# Patient Record
Sex: MALE | Race: WHITE | NOT HISPANIC OR LATINO | Employment: OTHER | URBAN - NONMETROPOLITAN AREA
[De-identification: names, ages, dates, MRNs, and addresses within clinical notes are randomized per-mention and may not be internally consistent; named-entity substitution may affect disease eponyms.]

---

## 2017-03-06 ENCOUNTER — OFFICE VISIT (OUTPATIENT)
Dept: NEUROSURGERY | Facility: CLINIC | Age: 77
End: 2017-03-06

## 2017-03-06 VITALS
WEIGHT: 286 LBS | BODY MASS INDEX: 38.74 KG/M2 | SYSTOLIC BLOOD PRESSURE: 122 MMHG | DIASTOLIC BLOOD PRESSURE: 60 MMHG | HEIGHT: 72 IN

## 2017-03-06 DIAGNOSIS — M50.30 DEGENERATION OF CERVICAL INTERVERTEBRAL DISC: Primary | ICD-10-CM

## 2017-03-06 DIAGNOSIS — Z78.9 NON-SMOKER: ICD-10-CM

## 2017-03-06 DIAGNOSIS — R55 SYNCOPE AND COLLAPSE: ICD-10-CM

## 2017-03-06 DIAGNOSIS — E66.3 OVERWEIGHT: ICD-10-CM

## 2017-03-06 PROCEDURE — 99204 OFFICE O/P NEW MOD 45 MIN: CPT | Performed by: NURSE PRACTITIONER

## 2017-03-06 RX ORDER — FUROSEMIDE 20 MG/1
20 TABLET ORAL DAILY
COMMUNITY
Start: 2017-02-15

## 2017-03-06 RX ORDER — ESCITALOPRAM OXALATE 20 MG/1
10 TABLET ORAL DAILY
COMMUNITY
Start: 2017-02-15

## 2017-03-06 RX ORDER — SPIRONOLACTONE 25 MG/1
25 TABLET ORAL DAILY
COMMUNITY
Start: 2017-02-15 | End: 2019-01-15

## 2017-03-06 RX ORDER — SIMVASTATIN 40 MG
40 TABLET ORAL NIGHTLY
COMMUNITY
Start: 2017-02-23

## 2017-03-06 RX ORDER — ROPINIROLE 0.25 MG/1
0.25 TABLET, FILM COATED ORAL DAILY
COMMUNITY
Start: 2017-02-02 | End: 2018-02-02 | Stop reason: DRUGHIGH

## 2017-03-06 RX ORDER — LISINOPRIL 5 MG/1
5 TABLET ORAL DAILY
COMMUNITY
Start: 2017-02-15

## 2017-03-06 RX ORDER — GABAPENTIN 400 MG/1
CAPSULE ORAL
COMMUNITY
Start: 2017-02-02 | End: 2017-05-02

## 2017-03-06 RX ORDER — METOLAZONE 5 MG/1
5 TABLET ORAL
COMMUNITY
Start: 2017-02-23 | End: 2019-01-15

## 2017-03-06 RX ORDER — POTASSIUM CHLORIDE 20 MEQ/1
20 TABLET, EXTENDED RELEASE ORAL 2 TIMES DAILY
COMMUNITY
Start: 2017-02-23

## 2017-03-06 NOTE — PROGRESS NOTES
Chief complaint:   Chief Complaint   Patient presents with   • Neck Pain     neck pain after a fall down some stairs at this home about 4 months ago, some arm numbness and tingling down both right and left sides.  Was sent for some physical therapy but this did not help at all.        Subjective     HPI: This is a 76-year-old male gentleman who is referred to us by Dr. Joya for neck pain.  He is here to be evaluated today.  He states that he did have neck surgery in 2017 and did help with the pain that is experiencing however about 4 months ago he did sustain a fall down some steps and said that he did have a return of his neck pain.  He states that the pain in his neck is constant.  Its worse when he moves his neck.  Its better when is lying down.  He says that he will have some pain that radiates down his right arm in a radicular fashion.  This pain is a burning sensation.  This pain is intermittent.  Its worse with activity.  He does have numbness and tingling in his hands.  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  He has been in a wheelchair since his stroke 2013.  Pain in his neck is a dull aching pain.  He rates the pain on a scale 0-10 at an 8.  He says it does interfere with his activities of daily living.  He is having difficulty and using his right arm since the fall.  He has been through physical therapy without any relief.  He is done no chiropractic care pain management injections.  He does complain of whenever he turns his head that he will fall asleep.  He says this was even noted during physical therapy as well.    Review of Systems   Constitutional: Positive for unexpected weight change.   HENT: Positive for hearing loss and sinus pressure.    Gastrointestinal:        Change in bowel movement   Endocrine: Positive for polyphagia and polyuria.   Musculoskeletal: Positive for joint swelling and neck pain.   Neurological:        Stroke  Memory loss/confusion   All  "other systems reviewed and are negative.       No past medical history on file.  No past surgical history on file.  No family history on file.  Social History   Substance Use Topics   • Smoking status: Never Smoker   • Smokeless tobacco: None   • Alcohol use No       (Not in a hospital admission)  Allergies:  Morphine and related and Penicillins    Objective      Vital Signs  Visit Vitals   • /60 (BP Location: Right arm, Patient Position: Sitting)   • Ht 72\" (182.9 cm)   • Wt 286 lb (130 kg)   • BMI 38.79 kg/m2       Physical Exam   Constitutional: He is oriented to person, place, and time. He appears well-developed and well-nourished.   HENT:   Head: Normocephalic.   Eyes: Conjunctivae, EOM and lids are normal. Pupils are equal, round, and reactive to light.   Neck: Normal range of motion.   Cardiovascular: Normal rate, regular rhythm and normal heart sounds.    Pulmonary/Chest: Effort normal and breath sounds normal.   Abdominal: Normal appearance.   Musculoskeletal: Normal range of motion.        Cervical back: He exhibits pain.   Neurological: He is alert and oriented to person, place, and time. He has normal reflexes. He displays normal reflexes. No cranial nerve deficit or sensory deficit. GCS eye subscore is 4. GCS verbal subscore is 5. GCS motor subscore is 6.   Reflex Scores:       Patellar reflexes are 2+ on the left side.  Strength is 5 out of 5.  Essential tremor noted in the right upper extremity.  I did not ambulate the patient.  Clonus was noted in the left lower extremity.  Adiel sign was negative.  The patient is able to transfer but for the most part does stay in the wheelchair.   Skin: Skin is warm.   Psychiatric: He has a normal mood and affect. His speech is normal and behavior is normal. Thought content normal. Cognition and memory are normal.       Results Review: CT scan of his cervical spine shows at the C3 through 7 anterior cervical fusion.  There is evidence of degeneration at C2 " 3 and at C7-T1.  There is also large spurring noted at these levels.  No fractures visualized.              Assessment/Plan: At this point what amended do is set the patient up to do an MRI of the cervical spine and then also doing a CT angiogram of the soft tissue of his neck to make sure that there is no compression on there carotid or vertebral arteries.  He will follow-up with Dr. Amos once is completed.  BMI shows the patient is overweight.  BMI chart was given the patient.  He is a nonsmoker.    I discussed the patients findings and my recommendations with patient    Haider Gray, SHELLY  03/06/17  11:31 AM

## 2017-03-15 ENCOUNTER — HOSPITAL ENCOUNTER (OUTPATIENT)
Dept: CT IMAGING | Facility: HOSPITAL | Age: 77
Discharge: HOME | End: 2017-03-15

## 2017-03-15 ENCOUNTER — HOSPITAL ENCOUNTER (OUTPATIENT)
Dept: MRI IMAGING | Facility: HOSPITAL | Age: 77
Discharge: HOME | End: 2017-03-15
Admitting: NURSE PRACTITIONER

## 2017-03-15 DIAGNOSIS — M50.30 DEGENERATION OF CERVICAL INTERVERTEBRAL DISC: Primary | ICD-10-CM

## 2017-03-15 DIAGNOSIS — R55 SYNCOPE AND COLLAPSE: ICD-10-CM

## 2017-03-15 DIAGNOSIS — M50.30 DEGENERATION OF CERVICAL INTERVERTEBRAL DISC: ICD-10-CM

## 2017-03-15 LAB — CREAT BLDA-MCNC: 1.8 MG/DL (ref 0.6–1.3)

## 2017-03-15 PROCEDURE — 70547 MR ANGIOGRAPHY NECK W/O DYE: CPT

## 2017-03-15 PROCEDURE — 72141 MRI NECK SPINE W/O DYE: CPT

## 2017-03-15 PROCEDURE — 82565 ASSAY OF CREATININE: CPT

## 2017-03-17 DIAGNOSIS — R55 SYNCOPE, UNSPECIFIED SYNCOPE TYPE: Primary | ICD-10-CM

## 2017-04-26 ENCOUNTER — HOSPITAL ENCOUNTER (OUTPATIENT)
Dept: MRI IMAGING | Facility: HOSPITAL | Age: 77
Discharge: HOME | End: 2017-04-26

## 2017-05-02 ENCOUNTER — OFFICE VISIT (OUTPATIENT)
Dept: NEUROSURGERY | Facility: CLINIC | Age: 77
End: 2017-05-02

## 2017-05-02 VITALS
SYSTOLIC BLOOD PRESSURE: 122 MMHG | DIASTOLIC BLOOD PRESSURE: 60 MMHG | HEIGHT: 72 IN | BODY MASS INDEX: 37.65 KG/M2 | WEIGHT: 278 LBS

## 2017-05-02 DIAGNOSIS — G25.0 ESSENTIAL TREMOR: ICD-10-CM

## 2017-05-02 DIAGNOSIS — Z78.9 NON-SMOKER: ICD-10-CM

## 2017-05-02 DIAGNOSIS — M54.42 CHRONIC BILATERAL LOW BACK PAIN WITH BILATERAL SCIATICA: ICD-10-CM

## 2017-05-02 DIAGNOSIS — R55 SYNCOPE AND COLLAPSE: ICD-10-CM

## 2017-05-02 DIAGNOSIS — M50.30 DEGENERATION OF CERVICAL INTERVERTEBRAL DISC: Primary | ICD-10-CM

## 2017-05-02 DIAGNOSIS — G89.29 CHRONIC BILATERAL LOW BACK PAIN WITH BILATERAL SCIATICA: ICD-10-CM

## 2017-05-02 DIAGNOSIS — M54.41 CHRONIC BILATERAL LOW BACK PAIN WITH BILATERAL SCIATICA: ICD-10-CM

## 2017-05-02 PROBLEM — M54.50 CHRONIC MIDLINE LOW BACK PAIN WITHOUT SCIATICA: Status: RESOLVED | Noted: 2017-05-02 | Resolved: 2017-05-02

## 2017-05-02 PROBLEM — M54.50 CHRONIC MIDLINE LOW BACK PAIN WITHOUT SCIATICA: Status: ACTIVE | Noted: 2017-05-02

## 2017-05-02 PROCEDURE — 99213 OFFICE O/P EST LOW 20 MIN: CPT | Performed by: NEUROLOGICAL SURGERY

## 2017-05-02 RX ORDER — RANITIDINE 300 MG/1
300 TABLET ORAL NIGHTLY
COMMUNITY
Start: 2017-04-07 | End: 2019-01-15

## 2017-05-02 RX ORDER — PANTOPRAZOLE SODIUM 40 MG/1
40 TABLET, DELAYED RELEASE ORAL DAILY
COMMUNITY
Start: 2017-04-07

## 2017-05-02 RX ORDER — GABAPENTIN 600 MG/1
600 TABLET ORAL 3 TIMES DAILY
COMMUNITY
Start: 2017-03-27 | End: 2018-03-27

## 2017-05-02 RX ORDER — PROMETHAZINE HYDROCHLORIDE AND CODEINE PHOSPHATE 6.25; 1 MG/5ML; MG/5ML
SYRUP ORAL
COMMUNITY
Start: 2017-03-29 | End: 2017-05-02

## 2017-05-02 RX ORDER — EXENATIDE 2 MG/.65ML
2 INJECTION, SUSPENSION, EXTENDED RELEASE SUBCUTANEOUS WEEKLY
COMMUNITY
Start: 2017-04-17 | End: 2020-08-27

## 2017-05-30 ENCOUNTER — HOSPITAL ENCOUNTER (OUTPATIENT)
Dept: MRI IMAGING | Facility: HOSPITAL | Age: 77
Discharge: HOME | End: 2017-05-30
Attending: NEUROLOGICAL SURGERY | Admitting: NEUROLOGICAL SURGERY

## 2017-05-30 ENCOUNTER — OFFICE VISIT (OUTPATIENT)
Dept: NEUROSURGERY | Facility: CLINIC | Age: 77
End: 2017-05-30

## 2017-05-30 VITALS
WEIGHT: 278 LBS | DIASTOLIC BLOOD PRESSURE: 60 MMHG | BODY MASS INDEX: 37.65 KG/M2 | SYSTOLIC BLOOD PRESSURE: 122 MMHG | HEIGHT: 72 IN

## 2017-05-30 DIAGNOSIS — G89.29 CHRONIC BILATERAL LOW BACK PAIN WITH BILATERAL SCIATICA: ICD-10-CM

## 2017-05-30 DIAGNOSIS — M50.30 DEGENERATION OF CERVICAL INTERVERTEBRAL DISC: ICD-10-CM

## 2017-05-30 DIAGNOSIS — M54.41 CHRONIC BILATERAL LOW BACK PAIN WITH BILATERAL SCIATICA: ICD-10-CM

## 2017-05-30 DIAGNOSIS — M51.37 DEGENERATION OF LUMBAR OR LUMBOSACRAL INTERVERTEBRAL DISC: Primary | ICD-10-CM

## 2017-05-30 DIAGNOSIS — M54.42 CHRONIC BILATERAL LOW BACK PAIN WITH BILATERAL SCIATICA: ICD-10-CM

## 2017-05-30 DIAGNOSIS — Z78.9 NON-SMOKER: ICD-10-CM

## 2017-05-30 PROCEDURE — 72148 MRI LUMBAR SPINE W/O DYE: CPT

## 2017-05-30 PROCEDURE — 99213 OFFICE O/P EST LOW 20 MIN: CPT | Performed by: NEUROLOGICAL SURGERY

## 2017-05-30 RX ORDER — CLINDAMYCIN HYDROCHLORIDE 150 MG/1
CAPSULE ORAL
COMMUNITY
Start: 2017-05-25 | End: 2017-06-06

## 2017-06-06 ENCOUNTER — OFFICE VISIT (OUTPATIENT)
Dept: NEUROLOGY | Facility: CLINIC | Age: 77
End: 2017-06-06

## 2017-06-06 VITALS
WEIGHT: 283 LBS | HEART RATE: 68 BPM | DIASTOLIC BLOOD PRESSURE: 68 MMHG | BODY MASS INDEX: 38.33 KG/M2 | HEIGHT: 72 IN | RESPIRATION RATE: 20 BRPM | SYSTOLIC BLOOD PRESSURE: 118 MMHG

## 2017-06-06 DIAGNOSIS — G45.0 VERTEBROBASILAR INSUFFICIENCY: ICD-10-CM

## 2017-06-06 DIAGNOSIS — R06.02 SHORTNESS OF BREATH: ICD-10-CM

## 2017-06-06 DIAGNOSIS — I10 ESSENTIAL (PRIMARY) HYPERTENSION: ICD-10-CM

## 2017-06-06 DIAGNOSIS — R25.1 TREMOR: ICD-10-CM

## 2017-06-06 DIAGNOSIS — R40.4 EPISODE OF ALTERED CONSCIOUSNESS: Primary | ICD-10-CM

## 2017-06-06 PROCEDURE — 99204 OFFICE O/P NEW MOD 45 MIN: CPT | Performed by: PSYCHIATRY & NEUROLOGY

## 2017-06-06 NOTE — PROGRESS NOTES
"Aliyah Chandra, 1940, is a male who is being seen today for   Chief Complaint   Patient presents with   • Loss of Consciousness       HISTORY OF PRESENT ILLNESS: Patient seen for episodes of altered consciousness.  Patient had prior neck surgery several years ago.  Patient had a fall 4 months ago out of the wheelchair and hurt his head and neck again in his back after he said his back popped.  Patient since says and has had several episodes of altered consciousness.  Sometimes when he turns his head or rides on a bumpy road he will suddenly go unconscious for 5-10 minutes.  There is no tongue biting or incontinence is no headache and there is no jerking.  Patient and awakens.  Patient had a prior stroke in 2013 that affected his left side.  Patient has quite a bit of pain in his neck.  Patient has MRA of the neck which was noted to have a high-grade stenosis in distal right vertebral artery.  The left vertebral artery was patent.  Patient did not have the dye test previously because his kidney function was poor (creatinine 1.8 in March ).  Patient was supposed is seen a renal specialist  who told the family that the patient's kidney function was \"fine\" but we do not have any of those records are blood work for review.  Also the patient was in the hospital at [facility] in April for heart problems and shortness of breath but we do not have any of those reports for review.  Patient also has been followed by Dr. Amos for his neck problems and ended up having an MRI of the cervical spine which showed status post anterior fusion apparently no other acute problems.  Patient also had an MRI of the lumbar spine which showed degenerative changes throughout the lumbar spine and patient has been referred to pain management.  Patient is also apparently diabetic on Bydureon.  And blood sugars and blood pressures have not been checked when these episodes occur.  REVIEW OF SYSTEMS: "   GENERAL: No fever chills  PULMONARY: As above  CVS:  As above  GASTROINTESTINAL: No acute GI distress  GENITOURINARY: No acute  distress  GYN: Not applicable  MUSCULOSKELETAL: As above  HEENT:  Patient  had cataract surgery  ENDOCRINE:  As above  PSYCHIATRIC: No acute psychiatric symptoms  HEMATOLOGY: No blood work for review  SKIN: No skin changes except for history of ingrown toenails  Family history reviewed: Patient's father had tremor  Social history: Patient denies alcohol or drug use or smoking.    PHYSICAL EXAMINATION:    GENERAL: Other than the patient's pain patient in no acute distress  CRANIUM: Normocephalic/atraumatic  HEENT: No acute fundic abnormalities.  Pupils equal round reactive to light.       EYES: EOMs intact without nystagmus.  Fields full to confrontation.       EARS:  Tympanic membranes normal and hears tuning fork bilaterally.       THROAT: No oropharynx abnormalities       NECK:  No bruits/no lymphadenopathy  CHEST: No acute cardiopulmonary abnormalities by auscultation  ABDOMEN: Nondistended  EXTREMITIES: Patient has dystrophic changes of the lower extremities below the knee.  Patient has positive dorsalis pedis pulses  NEURO: Patient alert and follows commands without difficulty  SPEECH:  Normal    CRANIAL NERVES:  Motor sensory about the face normal and symmetric  EXTREMITIES:  Patient has some rest tremor right greater than left upper extremities and increase in tremor with intention bilaterally.  Some cogwheeling /rigidity noted  MOTOR STRENGTH:  Motor strength seemed to be normal other than the intrinsic hand muscles with some atrophy noted in the ulnar innervated intrinsics of the right hand  STATION AND GAIT:  Patient required some assistance to stand and take steps which she did sparingly and then went back to the chair/no cerebellar abnormalities appreciated on finger nose  CEREBELLAR:  Patient minimally able to do heel shin  SENSORY:  Absent sensation to vibration to the  knees bilaterally with decreased pin sensation distal to proximal to the about mid calf.  This decrease in pin sensation was more significant left face arm and leg.    REFLEXES:  Absent reflexes throughout with toes equivocal    ASSESSMENT AND PLAN:  Episodes of altered consciousness of uncertain etiology.  Patient may have vertebrobasilar insufficiency and need CT angiography head and neck as soon as we can determine his situation with his kidney function.  Unfortunately we do not have any records on that at this point.  Also the patient's episodes could be related to his blood sugar blood pressure or cardiac functions were possible seizures.  The family is told that if the patient has another episode he needs to be evaluated in the emergency room immediately as there is some possible increased risk of stroke with this/ patient will be seen by vascular surgery tomorrow and also getting EEG/MRI brain and further blood work and overnight continuous oximetry.      Ross was seen today for loss of consciousness.    Diagnoses and all orders for this visit:    Episode of altered consciousness  -     CBC & Differential; Future  -     Comprehensive Metabolic Panel; Future  -     EEG; Future  -     Lipid Panel; Future  -     Magnesium; Future  -     MRI Brain Without Contrast; Future  -     Sedimentation Rate; Future  -     T4, Free; Future  -     Vitamin B12; Future  -     Folate; Future    Vertebrobasilar insufficiency  -     Ambulatory Referral to Vascular Surgery    Tremor    Essential (primary) hypertension   -     Lipid Panel; Future    Other orders  -     Cancel: MRI Angiogram Head Without Contrast; Future  -     Cancel: CT Angiogram Neck With & Without Contrast; Future

## 2017-06-06 NOTE — PATIENT INSTRUCTIONS
If patient has an episode of altered consciousness or stroke symptoms patient needs to return to emergency room immediately.  No driving until released.  Blood pressures and blood sugars are to be obtained regularly

## 2017-06-07 ENCOUNTER — OFFICE VISIT (OUTPATIENT)
Dept: VASCULAR SURGERY | Facility: CLINIC | Age: 77
End: 2017-06-07

## 2017-06-07 VITALS
HEART RATE: 72 BPM | WEIGHT: 280 LBS | SYSTOLIC BLOOD PRESSURE: 116 MMHG | DIASTOLIC BLOOD PRESSURE: 72 MMHG | BODY MASS INDEX: 37.93 KG/M2 | HEIGHT: 72 IN

## 2017-06-07 DIAGNOSIS — R55 SYNCOPE AND COLLAPSE: ICD-10-CM

## 2017-06-07 DIAGNOSIS — I65.01 VERTEBRAL ARTERY STENOSIS, RIGHT: ICD-10-CM

## 2017-06-07 DIAGNOSIS — I65.23 BILATERAL CAROTID ARTERY STENOSIS: Primary | ICD-10-CM

## 2017-06-07 PROCEDURE — 99214 OFFICE O/P EST MOD 30 MIN: CPT | Performed by: NURSE PRACTITIONER

## 2017-06-07 RX ORDER — ASPIRIN 81 MG/1
81 TABLET ORAL DAILY
Status: ON HOLD | COMMUNITY
End: 2018-04-09

## 2017-06-07 NOTE — PROGRESS NOTES
06/07/2017      Marino Pena MD  [address]  [address]  [place] [address]    Ross Chandra  1940    Chief Complaint   Patient presents with   • Stroke     Referred per Dr Pena. CVA in 2013 with residual left sided weakness,BUE tremor worse on right .Vertebrobaisilar insufficiency.       Dear Marino Pena MD:      HPI  I had the pleasure of seeing your patient Ross Chandra in the office today.  Thank you kindly for this consultation.  As you recall, Ross Chandra is a 76 y.o.  male who you are currently following for Evaluation of syncopal episodes.  The patient does state he has had previous neck surgery several years ago which did help his pain.  Unfortunately, he had a fall approximately 4 months ago and hurt his neck again.  Since his fall he has had several syncopal episodes.  He reports syncopal episodes with certain positions or pump B [place].  He has had a previous stroke in 2013 that left him with left-sided weakness.  He has recently been seen by Dr. Amos as well.  He has had recent testing including an MRA of the neck as well as an MRI of the cervical spine and lumbar spine, which I did review in office.  He does also have tremors, mostly to his right side.  He states he does do activities with his left side due to significant tremor on the right.    Past Medical History:   Diagnosis Date   • Depression    • GERD (gastroesophageal reflux disease)    • Hyperlipidemia    • Prediabetes    • Stroke 2013    Residual left sided weakness and bilateral arm tremor worse on right side   • Syncope        Past Surgical History:   Procedure Laterality Date   • GALLBLADDER SURGERY     • HERNIA REPAIR      Patient has had 3 hernia surgeries   • NECK SURGERY     • ULNAR NERVE REPAIR         Family History   Problem Relation Age of Onset   • Cancer Mother    • Alzheimer's disease Father    • No Known Problems Sister        Social History     Social History   • Marital status:       Spouse name: N/A   • Number of children: N/A   • Years of education: N/A     Occupational History   • Not on file.     Social History Main Topics   • Smoking status: Former Smoker     Types: Cigars   • Smokeless tobacco: Former User      Comment: quit over 50 years ago   • Alcohol use Yes      Comment: 1-2 [place] and cokes per month   • Drug use: No   • Sexual activity: Defer     Other Topics Concern   • Not on file     Social History Narrative       Allergies   Allergen Reactions   • Morphine And Related    • Penicillins        Prior to Admission medications    Medication Sig Start Date End Date Taking? Authorizing Provider   aspirin 81 MG EC tablet Take 81 mg by mouth Daily.   Yes Historical Provider, MD   BYDUREON 2 MG pen-injector Every 7 (Seven) Days. 4/17/17  Yes Historical Provider, MD   escitalopram (LEXAPRO) 20 MG tablet Daily. 2/15/17  Yes Historical Provider, MD   furosemide (LASIX) 20 MG tablet 20 mg Daily. 2/15/17  Yes Historical Provider, MD   gabapentin (NEURONTIN) 600 MG tablet 600 mg 3 (Three) Times a Day. 3/27/17  Yes Historical Provider, MD   lisinopril (PRINIVIL,ZESTRIL) 5 MG tablet  2/15/17  Yes Historical Provider, MD   metOLazone (ZAROXOLYN) 5 MG tablet 5 mg. Takes one tab on Tuesday and thursday 2/23/17  Yes Historical Provider, MD   pantoprazole (PROTONIX) 40 MG EC tablet 40 mg Daily. 4/7/17  Yes Historical Provider, MD   potassium chloride (K-DUR,KLOR-CON) 20 MEQ CR tablet 20 mEq 2 (Two) Times a Day. 2/23/17  Yes Historical Provider, MD   rOPINIRole (REQUIP) 0.25 MG tablet 0.25 mg Daily. 2/2/17  Yes Historical Provider, MD   simvastatin (ZOCOR) 40 MG tablet 40 mg Every Night. 2/23/17  Yes Historical Provider, MD   spironolactone (ALDACTONE) 25 MG tablet 25 mg Daily. 2/15/17  Yes Historical Provider, MD   raNITIdine (ZANTAC) 300 MG tablet 300 mg Every Night. 4/7/17   Historical Provider, MD       Review of Systems   Constitutional: Negative.    HENT: Negative.    Eyes: Negative.   "  Respiratory: Negative.    Cardiovascular: Negative.    Gastrointestinal: Negative.    Endocrine: Negative.    Genitourinary: Negative.    Musculoskeletal: Positive for arthralgias and neck pain.   Skin: Negative.    Allergic/Immunologic: Negative.    Neurological: Positive for tremors (right arm), syncope and numbness (burning down right arm).   Hematological: Negative.    Psychiatric/Behavioral: Negative.    All other systems reviewed and are negative.      /72  Pulse 72  Ht 72\" (182.9 cm)  Wt 280 lb (127 kg)  BMI 37.97 kg/m2  Physical Exam   Constitutional: He is oriented to person, place, and time. He appears well-developed and well-nourished. No distress.   HENT:   Head: Normocephalic and atraumatic.   Eyes: Pupils are equal, round, and reactive to light. No scleral icterus.   Neck: Neck supple.   Cardiovascular: Normal rate, regular rhythm, normal heart sounds and intact distal pulses.    Pulmonary/Chest: Effort normal and breath sounds normal.   Abdominal: Soft. Bowel sounds are normal.   Musculoskeletal:        Cervical back: He exhibits tenderness.   Mostly wheelchair bound, however can transfer   Neurological: He is alert and oriented to person, place, and time.   Weakness to left side from previous stroke   Skin: Skin is warm and dry. He is not diaphoretic.   Psychiatric: He has a normal mood and affect. His behavior is normal. Judgment and thought content normal.   Nursing note and vitals reviewed.      Mri Lumbar Spine Without Contrast    Result Date: 5/30/2017  Narrative: EXAMINATION:  MRI LUMBAR SPINE WO CONTRAST-  5/30/2017 13:48 CST  HISTORY: Low back pain. M54.42-Lumbago with sciatica, left side; M54.41-Lumbago with sciatica, right side; G89.29-Other chronic pain.  TECHNIQUE: Multiplanar imaging was performed.  COMPARISON: No comparison study.  FINDINGS: At L5-S1, there appears to be at least partial fusion of the disc space. There appears to be some high signal on T1 images within the " disc space suggesting disc calcification. There is endplate spurring producing mild dural sac compression. There is mild to moderate facet arthropathy. There is mild inferior recess foraminal narrowing due to endplate spurring bilaterally. L5 appears to be transitional in shape.  At L4-5, there is mild anterior subluxation of L4 compared to L5. There is mild diffuse disc bulging. There is severe facet arthropathy. There is thickening of ligamentum flavum. 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 There is moderate to severe right foraminal narrowing. There is mild left-sided foraminal narrowing.  At L3-4, there is mild disc bulging. There is severe facet arthropathy. There is thickening of ligamentum flavum. There is mild to moderate central spinal canal stenosis. There is mild inferior recess foraminal narrowing due to disc bulging and endplate spurring.  At L2-3, there is disc bulging and disc degeneration. There is dural sac compression. There is moderate facet arthropathy. There is mild to moderate central spinal canal narrowing. There is mild to moderate inferior recess foraminal narrowing due to disc bulging and endplate spurring.  At L1-2, there is disc bulging producing dural sac compression. There is disc degeneration. There is mild facet arthropathy. These factors produce mild central spinal canal narrowing. There is mild to moderate inferior recess foraminal narrowing due to disc bulging and endplate spurring.  The inferior tip of the conus is at the T12-L1 junction.      Impression: Degenerative changes throughout the lumbar spine. Please see the above report.  This report was finalized on 05/30/2017 16:32 by Dr. Uche Graff MD.    Addendum   Ran Bello MD   Wed Mar 15, 2017  1:38:34 PM CDT   Incidental note is made of a high-grade stenosis in the distal right  vertebral artery. The left vertebral is patent.  This report was finalized on 03/15/2017  13:38 by Dr. Ran Bello MD.   Study Result   MRA neck 3/15/2017 8:30 AM CDT      CLINICAL: syncope, collapse, cervical stenosis/degeneration;  M50.30-Other cervical disc degeneration, unspecified cervical region;  R55-Syncope and collapse      COMPARISON: NONE       Technique: Post-contrast 3D TOF SPGR images were obtained with 3-D and  MIP post processing and reconstruction.       Findings:   The visualized aortic arch and proximal great vessels are unremarkable.       The right common carotid artery, right internal carotid artery, and  right external carotid artery are unremarkable. No aneurysm or  occlusion. No flow-limiting lesion.       The left common carotid artery, left internal carotid artery, and left  external carotid artery are unremarkable. No aneurysm or occlusion. No  flow-limiting lesion.       IMPRESSION:  Impression:   1. Unremarkable MRA of the neck.           This report was finalized on 03/15/2017 11:53 by Dr. Ran Bello MD.       Patient Active Problem List   Diagnosis   • Degeneration of cervical intervertebral disc   • Overweight   • Non-smoker   • Syncope and collapse   • BMI 37.0-37.9, adult   • Chronic bilateral low back pain with bilateral sciatica   • Degeneration of lumbar or lumbosacral intervertebral disc         ICD-10-CM ICD-9-CM   1. Bilateral carotid artery stenosis I65.23 433.10     433.30   2. Vertebral artery stenosis, right I65.01 433.20   3. Syncope and collapse R55 780.2       Lab Frequency Next Occurrence   MRI angiogram neck wo contrast Once 4/26/2017   XR Spine Lumbar Complete With Flex & Ext Once 6/1/2017   CBC & Differential Once 6/11/2017   Comprehensive Metabolic Panel Once 6/11/2017   EEG Once 6/11/2017   Lipid Panel Once 6/11/2017   Magnesium Once 6/11/2017   MRI Brain Without Contrast Once 6/11/2017   Sedimentation Rate Once 6/11/2017   T4, Free Once 6/11/2017   Vitamin B12 Once 6/11/2017   Folate Once 6/11/2017   Overnight Sleep Oximetry Study Once  6/11/2017       Plan: After thoroughly evaluating Ross Chandra, I believe the best course of action is to Remain conservative from vascular standpoint.  I did go over his testing results with him and his wife.  He does have significant stenosis of the right vertebral artery however we do not perform any intervention for this.  We will continue to monitor him on an annual basis with repeat noninvasive testing including a carotid duplex.  The patient can continue taking her current medication regimen as previously planned.  This was all discussed in full with complete understanding.    Thank you for allowing me to participate in the care of your patient.  Please do not hesitate with any questions or concerns.  I will keep you aware of any further encounters with Ross Chandra.        Sincerely yours,         SHELLY Tirado, DO

## 2017-06-08 DIAGNOSIS — I10 ESSENTIAL (PRIMARY) HYPERTENSION: ICD-10-CM

## 2017-06-08 DIAGNOSIS — R40.4 EPISODE OF ALTERED CONSCIOUSNESS: ICD-10-CM

## 2017-06-14 ENCOUNTER — HOSPITAL ENCOUNTER (OUTPATIENT)
Dept: NEUROLOGY | Facility: HOSPITAL | Age: 77
Discharge: HOME | End: 2017-06-14
Attending: PSYCHIATRY & NEUROLOGY | Admitting: PSYCHIATRY & NEUROLOGY

## 2017-06-14 DIAGNOSIS — R40.4 EPISODE OF ALTERED CONSCIOUSNESS: ICD-10-CM

## 2017-06-14 PROCEDURE — 95816 EEG AWAKE AND DROWSY: CPT

## 2017-06-14 PROCEDURE — 95816 EEG AWAKE AND DROWSY: CPT | Performed by: PSYCHIATRY & NEUROLOGY

## 2017-06-15 ENCOUNTER — OFFICE VISIT (OUTPATIENT)
Dept: NEUROLOGY | Facility: CLINIC | Age: 77
End: 2017-06-15

## 2017-06-15 ENCOUNTER — HOSPITAL ENCOUNTER (OUTPATIENT)
Dept: MRI IMAGING | Facility: HOSPITAL | Age: 77
Discharge: HOME | End: 2017-06-15
Attending: PSYCHIATRY & NEUROLOGY | Admitting: PSYCHIATRY & NEUROLOGY

## 2017-06-15 VITALS
WEIGHT: 280 LBS | RESPIRATION RATE: 18 BRPM | HEART RATE: 78 BPM | BODY MASS INDEX: 37.93 KG/M2 | SYSTOLIC BLOOD PRESSURE: 126 MMHG | HEIGHT: 72 IN | DIASTOLIC BLOOD PRESSURE: 72 MMHG

## 2017-06-15 DIAGNOSIS — R40.4 EPISODE OF ALTERED CONSCIOUSNESS: ICD-10-CM

## 2017-06-15 DIAGNOSIS — R25.1 TREMOR: ICD-10-CM

## 2017-06-15 DIAGNOSIS — R40.4 EPISODE OF ALTERED CONSCIOUSNESS: Primary | ICD-10-CM

## 2017-06-15 DIAGNOSIS — G45.0 VERTEBRAL BASILAR INSUFFICIENCY: ICD-10-CM

## 2017-06-15 PROCEDURE — 99213 OFFICE O/P EST LOW 20 MIN: CPT | Performed by: PSYCHIATRY & NEUROLOGY

## 2017-06-15 PROCEDURE — 70551 MRI BRAIN STEM W/O DYE: CPT

## 2017-06-15 NOTE — PROGRESS NOTES
Subjective   Ross Chandra, 1940, is a male who is being seen today for   Chief Complaint   Patient presents with   • Follow-up       HISTORY OF PRESENT ILLNESS: Patient seen for episode of altered consciousness.  Patient is had no further episodes of altered consciousness.  Patient's MRI showed multistroke picture with cerebellar stroke on the left/ old and bilateral basal ganglia and white matter changes.  Also advance atrophy noted.  Patient has problems in his neck and back are being followed by neurosurgery group.  Patient saw Dr. Pisano regarding the situation with the right vertebral stenosis and he suggested conservative treatment.  Patient is still not certain as to whether a candidate for a CT angiography head and neck as we do not have any recent records from his renal doctor/Preet.  We have records from last year.  EEG showed no significant abnormalities.  Patient is to get follow-up carotids by Dr. Pisano.    REVIEW OF SYSTEMS:   GENERAL: No fever chills  PULMONARY: No acute shortness of breath  CVS:  No acute chest pain and previous cardiac workup is reviewed from hospitalization.  GASTROINTESTINAL: No acute GI distress  GENITOURINARY: No acute  distress  GYN: Not applicable  MUSCULOSKELETAL: As above  HEENT:  No acute vision or hearing change  ENDOCRINE:  No acute endocrine change  PSYCHIATRIC: No acute psychiatric symptoms  HEMATOLOGY: Patient's recent blood work showed no significant anemia although hematocrit was 38 and hemoglobin 12.7.  Rest of blood work reviewed and showed no major abnormalities with triglycerides 151 and cholesterol 189.  Creatinine recently was 1.9/BUN 26 with total protein 8.2.  SKIN: Patient has a chronic changes in the lower extremities  Family history reviewed and otherwise noncontributory except patient's father had tremor  Social history: Patient denies drug use alcohol use or smoking.      PHYSICAL EXAMINATION:    GENERAL: No acute distress  CRANIUM:  Normocephalic/atraumatic  HEENT: No acute fundic abnormalities.  Pupils equal round reactive to light.       EYES: Fields full to confrontation and EOMs intact without nystagmus       EARS:  Tympanic membranes normal and hears tuning fork bilaterally.       THROAT: No oropharynx abnormalities       NECK:  No bruits/no lymphadenopathy  CHEST: No acute cardiopulmonary abnormalities by auscultation  ABDOMEN: Nondistended  EXTREMITIES: Pulses symmetrical.  Patient has right greater than left rest tremor and some intentional tremor again more prominent on the right with some mild cogwheeling or rigidity  NEURO: Patient alert and follows commands without difficulty  SPEECH:  Normal    CRANIAL NERVES:  Motor sensory about the face normal and symmetric  EXTREMITIES:  Patient still following with a podiatrist/regarding toenail problems  MOTOR STRENGTH:  Patient has symmetric strength lower extremities and intrinsic hand muscle atrophy noted in the ulnar innervated intrinsics of the right hand.  STATION AND GAIT:  Patient stands with assistance and takes a few steps and goes back to the chair.  Patient does favor the left lower extremity when walking  CEREBELLAR:  Romberg questionable/ does tend to veer forward or backwards.  Finger to nose somewhat better on the right than the left  SENSORY:  Patient has decreased pin sensation left upper and lower extremity versus right.  Is also decreased pin and vibration sensation distal to proximal in lower extremities to mid calf bilaterally  REFLEXES:   absent throughout upper and lower extremities/toes equivocal      ASSESSMENT AND PLAN:  Patient with vertebral basilar insufficiency.  We discussed also options for treatment at this point but we decided to leave patient's present medication plan unchanged.  Patient does have the tremor which is both intentional and rest character to it.  Patient family is to discuss with PCP about possible slight increase in dosage of the Requip to  see if this improves the tremor.  We will be following up in 3 months.  I do not think patient is a good candidate for CT angiography head and neck at this point however if patient has further stroke symptomatology / episodes of altered consciousness is to be taken to the emergency room immediately.      Ross was seen today for follow-up.    Diagnoses and all orders for this visit:    Episode of altered consciousness    Vertebral basilar insufficiency    Tremor

## 2017-06-15 NOTE — PATIENT INSTRUCTIONS
Patient to discuss with PCP regarding possible increase in dosage of the ropinirole for tremor.  Patient is to get records for us from follow-up visit to Dr. Oviedo

## 2017-06-16 DIAGNOSIS — R06.02 SHORTNESS OF BREATH: ICD-10-CM

## 2017-09-15 ENCOUNTER — OFFICE VISIT (OUTPATIENT)
Dept: NEUROLOGY | Facility: CLINIC | Age: 77
End: 2017-09-15

## 2017-09-15 VITALS
RESPIRATION RATE: 20 BRPM | SYSTOLIC BLOOD PRESSURE: 140 MMHG | HEART RATE: 84 BPM | WEIGHT: 275 LBS | HEIGHT: 72 IN | BODY MASS INDEX: 37.25 KG/M2 | DIASTOLIC BLOOD PRESSURE: 74 MMHG

## 2017-09-15 DIAGNOSIS — R25.1 TREMOR: Primary | ICD-10-CM

## 2017-09-15 PROCEDURE — 99213 OFFICE O/P EST LOW 20 MIN: CPT | Performed by: PSYCHIATRY & NEUROLOGY

## 2017-09-15 NOTE — PROGRESS NOTES
Patient to check with PCP about possible increase in Requip but we will have to watch for increased tendency for orthostasis.

## 2018-02-02 ENCOUNTER — OFFICE VISIT (OUTPATIENT)
Dept: NEUROLOGY | Facility: CLINIC | Age: 78
End: 2018-02-02

## 2018-02-02 VITALS
DIASTOLIC BLOOD PRESSURE: 84 MMHG | HEIGHT: 72 IN | WEIGHT: 282 LBS | RESPIRATION RATE: 20 BRPM | BODY MASS INDEX: 38.19 KG/M2 | HEART RATE: 60 BPM | SYSTOLIC BLOOD PRESSURE: 120 MMHG

## 2018-02-02 DIAGNOSIS — G45.0 VERTEBROBASILAR INSUFFICIENCY: ICD-10-CM

## 2018-02-02 DIAGNOSIS — R25.1 TREMOR: Primary | ICD-10-CM

## 2018-02-02 DIAGNOSIS — G56.21 TARDY PALSY OF RIGHT ULNAR NERVE: ICD-10-CM

## 2018-02-02 PROCEDURE — 99214 OFFICE O/P EST MOD 30 MIN: CPT | Performed by: PSYCHIATRY & NEUROLOGY

## 2018-02-02 RX ORDER — PRIMIDONE 50 MG/1
50 TABLET ORAL NIGHTLY
COMMUNITY
Start: 2018-01-02 | End: 2019-01-15

## 2018-02-02 RX ORDER — ROPINIROLE 0.25 MG/1
0.25 TABLET, FILM COATED ORAL NIGHTLY
COMMUNITY

## 2018-02-02 NOTE — PROGRESS NOTES
Subjective   Ross Chandra, 1940, is a male who is being seen today for   Chief Complaint   Patient presents with   • Follow-up       HISTORY OF PRESENT ILLNESS:Extended follow-up.  Patient seen for tremor.  Patient when he started out on the Requip seemed to have benefit but then increasing the Requip to 2 at night did not seem to change anything.  He is to review that with his PCP about possibly going to one in the morning and 2 at night watching for side effects.  Patient has not had any history of melanoma.  Patient has vertebrobasilar insufficiency possibly an really not a candidate for his CT angiogram with  GFR of 35.  Patient is noticed to have right ulnar innervated muscles and intrinsic atrophy in the hand and had previous ulnar transposition the left elbow.  Now probably needs evaluation for ulnar transposition with his good (non-stroke) hand.  Patient is to have physical therapy provide palpable padding for the right elbow and get nerve conduction testing with possible neurosurgical evaluation.  Patient is had no further vertebrobasilar symptoms or stroke symptoms.    REVIEW OF SYSTEMS:   GENERAL: As above  PULMONARY: No acute shortness of breath  CVS:  No acute chest pain  GASTROINTESTINAL: No acute GI distress  GENITOURINARY: No acute  distress  GYN: Not applicable  MUSCULOSKELETAL: As above.    HEENT:  No acute vision or hearing change  ENDOCRINE:  No acute endocrine symptoms  PSYCHIATRIC: No acute psych symptoms  HEMATOLOGY: Mild anemia  SKIN: No acute skin changes and no melanoma  Family history reviewed and otherwise noncontributory  Social history: Patient denies smoking or drug use.  Patient does use alcohol.    PHYSICAL EXAMINATION:    GENERAL: No acute distress.  Patient does have rest greater than intentional tremor right greater than left upper extremities and lower extremities.  Mild cogwheeling / rigidity on the right  CRANIUM: Normocephalic/atraumatic  HEENT: No acute fundic  abnormalities.  Pupils equal round reactive to light.       EYES: EOMs intact without nystagmus and fields full to confrontation       EARS:  Tympanic membranes normal/ hears tuning fork bilaterally       THROAT: No oropharynx abnormalities       NECK:  No bruits/no lymphadenopathy  CHEST: No acute cardiopulmonary abnormalities by auscultation  ABDOMEN: Nondistended  EXTREMITIES: Patient has Discoloration/ the left foot  NEURO: Patient alert and follows commands without difficulty  SPEECH:  Normal    CRANIAL NERVES:  Motor sensory about the face normal and symmetric except for slight droop of the left nasolabial fold  EXTREMITIES:  Pulses symmetrical  MOTOR STRENGTH:  Motor strength patient has symmetric strength in the lower extremities with some discoordination of fine motor movements in the left upper and lower extremity.  Patient has the intrinsic hand muscle atrophy/weakness noted in the ulnar innervated intrinsics in the right hand which appears slightly more significant than in previous exam.  STATION AND GAIT:  Patient stands with assist and takes a few steps.  His blood pressure 120/84 seated and 110/80 standing.  Patient favors the left lower extremity when walking.  CEREBELLAR:  Patient able to minimally do heel to shin bilaterally.  Patient is able to do finger-nose a little better right than left.    SENSORY:  Patient has decreased pin sensation diffusely left arm and leg versus right.  REFLEXES:  Reflexes are absent throughout upper and lower extremities with toes equivocal      ASSESSMENT AND PLAN:  Patient with vertebrobasilar insufficiency and tremor.  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  Patient is to make sure to get elbow pad for the right elbow and work with physical therapy on that as  well.  I spent 25 minutes with this patient with 15 minutes counseling.      There are no diagnoses linked to this encounter.

## 2018-02-13 ENCOUNTER — OFFICE VISIT (OUTPATIENT)
Dept: NEUROSURGERY | Facility: CLINIC | Age: 78
End: 2018-02-13

## 2018-02-13 VITALS
DIASTOLIC BLOOD PRESSURE: 60 MMHG | WEIGHT: 282 LBS | SYSTOLIC BLOOD PRESSURE: 100 MMHG | BODY MASS INDEX: 38.19 KG/M2 | HEIGHT: 72 IN

## 2018-02-13 DIAGNOSIS — M51.37 DEGENERATION OF LUMBAR OR LUMBOSACRAL INTERVERTEBRAL DISC: Primary | ICD-10-CM

## 2018-02-13 DIAGNOSIS — Z78.9 NON-TOBACCO USER: ICD-10-CM

## 2018-02-13 DIAGNOSIS — G56.21 LESION OF RIGHT ULNAR NERVE: ICD-10-CM

## 2018-02-13 PROBLEM — M54.42 CHRONIC BILATERAL LOW BACK PAIN WITH BILATERAL SCIATICA: Status: RESOLVED | Noted: 2017-05-02 | Resolved: 2018-02-13

## 2018-02-13 PROBLEM — G89.29 CHRONIC BILATERAL LOW BACK PAIN WITH BILATERAL SCIATICA: Status: RESOLVED | Noted: 2017-05-02 | Resolved: 2018-02-13

## 2018-02-13 PROBLEM — M54.41 CHRONIC BILATERAL LOW BACK PAIN WITH BILATERAL SCIATICA: Status: RESOLVED | Noted: 2017-05-02 | Resolved: 2018-02-13

## 2018-02-13 PROBLEM — E66.3 OVERWEIGHT: Status: RESOLVED | Noted: 2017-03-06 | Resolved: 2018-02-13

## 2018-02-13 PROCEDURE — 99214 OFFICE O/P EST MOD 30 MIN: CPT | Performed by: NURSE PRACTITIONER

## 2018-02-13 NOTE — PROGRESS NOTES
Chief complaint:   Chief Complaint   Patient presents with   • Follow-up     Degeneration of Lumbar or Lumbosacral intervertebral disc       Subjective     HPI: This is a 77-year-old male gentleman who is seen in the past for neck and back pain.  He comes in today with a new complaint of right arm weakness.  He says that this is been going on for a few months now.  He had a period where his arm went completely numb but then it did come back.  He has been working with his neurologist to notice that he had muscle wasting in his hand.  He wanted him to come up here to see us for surgical evaluation.  He has not had a EMG/NCS done of his right upper extremity.  The patient has already had a C3 through 7 ACDF.  Patient is also had a history of stroke that has left him with weakness.  The patient only can stand up and walk short distances however he does not go very long.  He does have some limitations of his left upper extremity in his right hand is is most prominent arm.  He also does have an essential tremor in the right arm as well.  Is not complaining of any pain.  Denies any numbness or tingling.  He does like his hand is losing its  is noticed a difference in the last few months compared to what it was.  Denies any bowel or bladder incontinence.  Is not currently in any pain in his arm.  Patient is disabled from a stroke.    Review of Systems   Musculoskeletal: Positive for arthralgias, back pain, neck pain and neck stiffness.   Neurological: Positive for weakness and numbness.   All other systems reviewed and are negative.       Past Medical History:   Diagnosis Date   • Depression    • GERD (gastroesophageal reflux disease)    • Hyperlipidemia    • Prediabetes    • Stroke 2013    Residual left sided weakness and bilateral arm tremor worse on right side   • Syncope      Past Surgical History:   Procedure Laterality Date   • GALLBLADDER SURGERY     • HERNIA REPAIR      Patient has had 3 hernia surgeries   •  "NECK SURGERY     • ULNAR NERVE REPAIR       Family History   Problem Relation Age of Onset   • Cancer Mother    • Alzheimer's disease Father    • No Known Problems Sister      Social History   Substance Use Topics   • Smoking status: Former Smoker   • Smokeless tobacco: Former User   • Alcohol use Yes       (Not in a hospital admission)  Allergies:  Morphine; Morphine and related; and Penicillins    Objective      Vital Signs  /60  Ht 182.9 cm (72\")  Wt 128 kg (282 lb)  BMI 38.25 kg/m2    Physical Exam   Constitutional: He is oriented to person, place, and time. He appears well-developed and well-nourished.   HENT:   Head: Normocephalic.   Eyes: Conjunctivae, EOM and lids are normal. Pupils are equal, round, and reactive to light.   Neck: Normal range of motion.   Cardiovascular: Normal rate, regular rhythm and normal heart sounds.    Pulmonary/Chest: Effort normal and breath sounds normal.   Abdominal: Normal appearance.   Musculoskeletal: Normal range of motion.        Cervical back: He exhibits pain.        Lumbar back: He exhibits pain.        Left hand: Decreased sensation noted. Decreased sensation is present in the ulnar distribution.   Muscle wasting noted in the webs of the hand and also thenar wasting noticed as well.  Patient presents in a wheelchair.  He is able to move his lower extremities and his upper extremities but he does have some weakness on the left side due to his previous CVA   Neurological: He is alert and oriented to person, place, and time. He has normal strength and normal reflexes. He displays normal reflexes. No cranial nerve deficit or sensory deficit. GCS eye subscore is 4. GCS verbal subscore is 5. GCS motor subscore is 6.   Skin: Skin is warm.   Psychiatric: He has a normal mood and affect. His speech is normal and behavior is normal. Thought content normal. Cognition and memory are normal.       Results Review: No imaging          Assessment/Plan: At this point I am going " to recommend that the patient undergo an EMG/NCS of his right upper extremity for evaluation of ulnar nerve neuropathy versus a carpal tunnel.  We will follow-up with him once this is completed and have him see Dr. Amos.  BMI shows the patient is very overweight.  BMI chart was given the patient.  He is a nonsmoker.      Ross was seen today for follow-up.    Diagnoses and all orders for this visit:    Degeneration of lumbar or lumbosacral intervertebral disc    Lesion of right ulnar nerve  -     EMG & Nerve Conduction Test; Future    Non-tobacco user    BMI 38.0-38.9,adult          I discussed the patients findings and my recommendations with patient    Haider Gray, APRN  02/13/18  12:41 PM

## 2018-02-13 NOTE — PATIENT INSTRUCTIONS

## 2018-03-02 DIAGNOSIS — G56.21 LESION OF RIGHT ULNAR NERVE: ICD-10-CM

## 2018-03-21 ENCOUNTER — OFFICE VISIT (OUTPATIENT)
Dept: NEUROSURGERY | Facility: CLINIC | Age: 78
End: 2018-03-21

## 2018-03-21 VITALS
BODY MASS INDEX: 38.19 KG/M2 | HEIGHT: 72 IN | DIASTOLIC BLOOD PRESSURE: 82 MMHG | SYSTOLIC BLOOD PRESSURE: 124 MMHG | WEIGHT: 282 LBS

## 2018-03-21 DIAGNOSIS — M50.30 DEGENERATION OF CERVICAL INTERVERTEBRAL DISC: ICD-10-CM

## 2018-03-21 DIAGNOSIS — G56.21 LESION OF RIGHT ULNAR NERVE: Primary | ICD-10-CM

## 2018-03-21 DIAGNOSIS — Z78.9 NON-TOBACCO USER: ICD-10-CM

## 2018-03-21 PROCEDURE — 99213 OFFICE O/P EST LOW 20 MIN: CPT | Performed by: NEUROLOGICAL SURGERY

## 2018-03-21 NOTE — H&P
SUBJECTIVE:  Patient ID: Ross Chandra is a 77 y.o. male is here today for follow-up.    Chief Complaint: Right hand numbness  Chief Complaint   Patient presents with   • Right hand numbness     patient here to discuss results of RUE EMG/NCV from [facility] on 3/1/18       HPI  77-year-old gentleman with a past medical history of an extensive anterior cervical fusion involving 45 levels.  He presents with a several week history of gradual worsening numbness and tingling in the fourth and fifth fingers and the ulnar aspect of his right hand as well as weakness and wasting.  He has chronic neck pain is been no changes to his neck pain.  He does not describe her admit any radicular type pain in the right arm.  He does have an essential tremor on the right arm he is wheelchair bound from previous stroke.  He is right-handed.    The following portions of the patient's history were reviewed and updated as appropriate: allergies, current medications, past family history, past medical history, past social history, past surgical history and problem list.    OBJECTIVE:    Review of Systems   Neurological: Positive for weakness and numbness.   All other systems reviewed and are negative.         Physical Exam   Constitutional: He is oriented to person, place, and time.   Neurological: He is oriented to person, place, and time. He has a normal Finger-Nose-Finger Test.   Psychiatric: His speech is normal.       Neurologic Exam     Mental Status   Oriented to person, place, and time.   Attention: normal.   Speech: speech is normal   Level of consciousness: alert  Knowledge: good.     Cranial Nerves   Cranial nerves II through XII intact.     Motor Exam   Muscle bulk: decreased  Overall muscle tone: normal  Right arm pronator drift: absent  Left arm pronator drift: absent    Strength   Strength 5/5 except as noted.   Right interossei: 4/5  Left interossei: 5/5Hyperthenar wasting on the right, severe wasting in the webspace between  the thumb and first finger on the right.     Sensory Exam   Right arm light touch: Diminished sensation to light touch and sharp in an ulnar distribution of the right hand.  Left arm light touch: normal  Left leg light touch: normal  Left arm pinprick: normal  Right leg pinprick: normal  Left leg pinprick: normal    Gait, Coordination, and Reflexes     Gait  Gait: (Nonambulatory from previous stroke)    Coordination   Finger to nose coordination: normal    Tremor   Resting tremor: absent  Intention tremor: present  Action tremor: right arm    Reflexes   Reflexes 2+ except as noted.       Independent Review of Radiographic Studies:   EMG nerve conduction study is consistent with a right ulnar neuropathy, cubital tunnel syndrome on the right    ASSESSMENT/PLAN:  The patient has numbness and tingling in an ulnar distribution in the right hand along with wasting and weakness consistent with an ulnar neuropathy.  Nerve conduction studies consistent with cubital tunnel syndrome.  My recommendation would be ulnar nerve release at the right elbow.  The risks and benefits of the procedure were discussed at length which included but were not limited to infection, bleeding, damage to the ulnar nerve, permanent pain numbness tingling and weakness in the right hand, stroke, coma, and death.  The patient and his family acknowledged understanding of this.  Their questions and concerns were addressed.  We will get him prepped and scheduled as soon as possible.      1. Lesion of right ulnar nerve    2. Degeneration of cervical intervertebral disc    3. BMI 38.0-38.9,adult    4. Non-tobacco user            Return for postoperatively.      Jorge Luis Amos MD

## 2018-03-21 NOTE — PATIENT INSTRUCTIONS

## 2018-03-27 ENCOUNTER — APPOINTMENT (OUTPATIENT)
Dept: PREADMISSION TESTING | Facility: HOSPITAL | Age: 78
End: 2018-03-27

## 2018-03-27 VITALS
HEIGHT: 68 IN | DIASTOLIC BLOOD PRESSURE: 90 MMHG | OXYGEN SATURATION: 97 % | RESPIRATION RATE: 18 BRPM | HEART RATE: 62 BPM | SYSTOLIC BLOOD PRESSURE: 159 MMHG | BODY MASS INDEX: 44.94 KG/M2 | WEIGHT: 296.52 LBS

## 2018-03-27 DIAGNOSIS — G56.21 LESION OF RIGHT ULNAR NERVE: ICD-10-CM

## 2018-03-27 LAB
ALBUMIN SERPL-MCNC: 3.9 G/DL (ref 3.5–5)
ALBUMIN/GLOB SERPL: 1.1 G/DL (ref 1.1–2.5)
ALP SERPL-CCNC: 82 U/L (ref 24–120)
ALT SERPL W P-5'-P-CCNC: 34 U/L (ref 0–54)
ANION GAP SERPL CALCULATED.3IONS-SCNC: 8 MMOL/L (ref 4–13)
AST SERPL-CCNC: 26 U/L (ref 7–45)
BASOPHILS # BLD AUTO: 0.05 10*3/MM3 (ref 0–0.2)
BASOPHILS NFR BLD AUTO: 0.8 % (ref 0–2)
BILIRUB SERPL-MCNC: 0.3 MG/DL (ref 0.1–1)
BILIRUB UR QL STRIP: NEGATIVE
BUN BLD-MCNC: 14 MG/DL (ref 5–21)
BUN/CREAT SERPL: 12 (ref 7–25)
CALCIUM SPEC-SCNC: 8.4 MG/DL (ref 8.4–10.4)
CHLORIDE SERPL-SCNC: 101 MMOL/L (ref 98–110)
CLARITY UR: CLEAR
CO2 SERPL-SCNC: 29 MMOL/L (ref 24–31)
COLOR UR: YELLOW
CREAT BLD-MCNC: 1.17 MG/DL (ref 0.5–1.4)
DEPRECATED RDW RBC AUTO: 45.6 FL (ref 40–54)
EOSINOPHIL # BLD AUTO: 0.18 10*3/MM3 (ref 0–0.7)
EOSINOPHIL NFR BLD AUTO: 2.8 % (ref 0–4)
ERYTHROCYTE [DISTWIDTH] IN BLOOD BY AUTOMATED COUNT: 13.4 % (ref 12–15)
GFR SERPL CREATININE-BSD FRML MDRD: 60 ML/MIN/1.73
GLOBULIN UR ELPH-MCNC: 3.6 GM/DL
GLUCOSE BLD-MCNC: 105 MG/DL (ref 70–100)
GLUCOSE UR STRIP-MCNC: NEGATIVE MG/DL
HCT VFR BLD AUTO: 40.4 % (ref 40–52)
HGB BLD-MCNC: 13.2 G/DL (ref 14–18)
HGB UR QL STRIP.AUTO: NEGATIVE
IMM GRANULOCYTES # BLD: 0.01 10*3/MM3 (ref 0–0.03)
IMM GRANULOCYTES NFR BLD: 0.2 % (ref 0–5)
KETONES UR QL STRIP: NEGATIVE
LEUKOCYTE ESTERASE UR QL STRIP.AUTO: NEGATIVE
LYMPHOCYTES # BLD AUTO: 1.58 10*3/MM3 (ref 0.72–4.86)
LYMPHOCYTES NFR BLD AUTO: 24.8 % (ref 15–45)
MCH RBC QN AUTO: 30.3 PG (ref 28–32)
MCHC RBC AUTO-ENTMCNC: 32.7 G/DL (ref 33–36)
MCV RBC AUTO: 92.9 FL (ref 82–95)
MONOCYTES # BLD AUTO: 0.63 10*3/MM3 (ref 0.19–1.3)
MONOCYTES NFR BLD AUTO: 9.9 % (ref 4–12)
NEUTROPHILS # BLD AUTO: 3.92 10*3/MM3 (ref 1.87–8.4)
NEUTROPHILS NFR BLD AUTO: 61.5 % (ref 39–78)
NITRITE UR QL STRIP: NEGATIVE
NRBC BLD MANUAL-RTO: 0 /100 WBC (ref 0–0)
PH UR STRIP.AUTO: 5.5 [PH] (ref 5–8)
PLATELET # BLD AUTO: 216 10*3/MM3 (ref 130–400)
PMV BLD AUTO: 10.8 FL (ref 6–12)
POTASSIUM BLD-SCNC: 4.7 MMOL/L (ref 3.5–5.3)
PROT SERPL-MCNC: 7.5 G/DL (ref 6.3–8.7)
PROT UR QL STRIP: NEGATIVE
RBC # BLD AUTO: 4.35 10*6/MM3 (ref 4.8–5.9)
SODIUM BLD-SCNC: 138 MMOL/L (ref 135–145)
SP GR UR STRIP: 1.02 (ref 1–1.03)
UROBILINOGEN UR QL STRIP: NORMAL
WBC NRBC COR # BLD: 6.37 10*3/MM3 (ref 4.8–10.8)

## 2018-03-27 PROCEDURE — 93005 ELECTROCARDIOGRAM TRACING: CPT

## 2018-03-27 PROCEDURE — 80053 COMPREHEN METABOLIC PANEL: CPT | Performed by: NEUROLOGICAL SURGERY

## 2018-03-27 PROCEDURE — 93010 ELECTROCARDIOGRAM REPORT: CPT | Performed by: INTERNAL MEDICINE

## 2018-03-27 PROCEDURE — 85025 COMPLETE CBC W/AUTO DIFF WBC: CPT | Performed by: NEUROLOGICAL SURGERY

## 2018-03-27 PROCEDURE — 36415 COLL VENOUS BLD VENIPUNCTURE: CPT

## 2018-03-27 PROCEDURE — 81003 URINALYSIS AUTO W/O SCOPE: CPT | Performed by: NEUROLOGICAL SURGERY

## 2018-03-27 NOTE — DISCHARGE INSTRUCTIONS
DAY OF SURGERY INSTRUCTIONS        YOUR SURGEON: Jorge Luis Amos     PROCEDURE: Right Ulnar Nerve Decompression     DATE OF SURGERY: April 9, 2018     ARRIVAL TIME: AS DIRECTED BY OFFICE    DAY OF SURGERY TAKE ONLY THESE MEDICATIONS: None         BEFORE YOU COME TO THE HOSPITAL  (Pre-op instructions)  • Do not eat, drink, smoke or chew gum after midnight the night before surgery.  This also includes no mints.  • Morning of surgery take only the medicines you have been instructed with a sip of water unless otherwise instructed  by your physician.  • Do not shave, wear makeup or dark nail polish.  • Remove all jewelry including rings.  • Leave anything you consider valuable at home.  • Leave your suitcase in the car until after your surgery.  • Bring the following with you if applicable:  o Picture ID and insurance, Medicare or Medicaid cards  o Co-pay/deductible required by insurance (cash, check, credit card)  o Copy of advance directive, living will or power-of- documents if not brought to PAT  o CPAP or BIPAP mask and tubing  o Relaxation aids (MP3 player, book, magazine)  • On the day of surgery check in at registration located at the main entrance of the hospital.       Outpatient Surgery Guidelines, Adult  Outpatient procedures are those for which the person having the procedure is allowed to go home the same day as the procedure. Various procedures are done on an outpatient basis. You should follow some general guidelines if you will be having an outpatient procedure.  LET YOUR HEALTH CARE PROVIDER KNOW ABOUT:  · Any allergies you have.  · All medicines you are taking, including vitamins, herbs, eye drops, creams, and over-the-counter medicines.  · Previous problems you or members of your family have had with the use of anesthetics.  · Any blood disorders you have.  · Previous surgeries you have had.  · Medical conditions you have.  RISKS AND COMPLICATIONS  Your health care provider will discuss  possible risks and complications with you before surgery. Common risks and complications include:    · Problems due to the use of anesthetics.  · Blood loss and replacement (does not apply to minor surgical procedures).  · Temporary increase in pain due to surgery.  · Uncorrected pain or problems that the surgery was meant to correct.  · Infection.  · New damage.  BEFORE THE PROCEDURE  · Ask your health care provider about changing or stopping your regular medicines. You may need to stop taking certain medicines in the days or weeks before the procedure.  · Stop smoking at least 2 weeks before surgery. This lowers your risk for complications during and after surgery. Ask your health care provider for help with this if needed.  · Eat your usual meals and a light supper the day before surgery. Continue fluid intake. Do not drink alcohol.  · Do not eat or drink after midnight the night before your surgery.   · Arrange for someone to take you home and to stay with you for 24 hours after the procedure. Medicine given for your procedure may affect your ability to drive or to care for yourself.  · Call your health care provider's office if you develop an illness or problem that may prevent you from safely having your procedure.  AFTER THE PROCEDURE  After surgery, you will be taken to a recovery area, where your progress will be monitored. If there are no complications, you will be allowed to go home when you are awake, stable, and taking fluids well. You may have numbness around the surgical site. Healing will take some time. You will have tenderness at the surgical site and may have some swelling and bruising. You may also have some nausea.  HOME CARE INSTRUCTIONS  · Do not drive for 24 hours, or as directed by your health care provider. Do not drive while taking prescription pain medicines.  · Do not drink alcohol for 24 hours.  · Do not make important decisions or sign legal documents for 24 hours.  · You may resume a  normal diet and activities as directed.  · Do not lift anything heavier than 10 pounds (4.5 kg) or play contact sports until your health care provider says it is okay.  · Change your bandages (dressings) as directed.  · Only take over-the-counter or prescription medicines as directed by your health care provider.  · Follow up with your health care provider as directed.  SEEK MEDICAL CARE IF:  · You have increased bleeding (more than a small spot) from the surgical site.  · You have redness, swelling, or increasing pain in the wound.  · You see pus coming from the wound.  · You have a fever.  · You notice a bad smell coming from the wound or dressing.  · You feel lightheaded or faint.  · You develop a rash.  · You have trouble breathing.  · You develop allergies.  MAKE SURE YOU:  · Understand these instructions.  · Will watch your condition.  · Will get help right away if you are not doing well or get worse.     This information is not intended to replace advice given to you by your health care provider. Make sure you discuss any questions you have with your health care provider.     Document Released: 09/12/2002 Document Revised: 05/03/2016 Document Reviewed: 05/22/2014  [employer] Interactive Patient Education ©2016 [employer] Inc.       Fall Prevention in Hospitals, Adult  As a hospital patient, your condition and the treatments you receive can increase your risk for falls. Some additional risk factors for falls in a hospital include:  · Being in an unfamiliar environment.  · Being on bed rest.  · Your surgery.  · Taking certain medicines.  · Your tubing requirements, such as intravenous (IV) therapy or catheters.  It is important that you learn how to decrease fall risks while at the hospital. Below are important tips that can help prevent falls.  SAFETY TIPS FOR PREVENTING FALLS  Talk about your risk of falling.  · Ask your health care provider why you are at risk for falling. Is it your medicine, illness, tubing  placement, or something else?  · Make a plan with your health care provider to keep you safe from falls.  · Ask your health care provider or pharmacist about side effects of your medicines. Some medicines can make you dizzy or affect your coordination.  Ask for help.  · Ask for help before getting out of bed. You may need to press your call button.  · Ask for assistance in getting safely to the toilet.  · Ask for a walker or cane to be put at your bedside. Ask that most of the side rails on your bed be placed up before your health care provider leaves the room.  · Ask family or friends to sit with you.  · Ask for things that are out of your reach, such as your glasses, hearing aids, telephone, bedside table, or call button.  Follow these tips to avoid falling:  · Stay lying or seated, rather than standing, while waiting for help.  · Wear rubber-soled slippers or shoes whenever you walk in the hospital.  · Avoid quick, sudden movements.  ¨ Change positions slowly.  ¨ Sit on the side of your bed before standing.  ¨ Stand up slowly and wait before you start to walk.  · Let your health care provider know if there is a spill on the floor.  · Pay careful attention to the medical equipment, electrical cords, and tubes around you.  · When you need help, use your call button by your bed or in the bathroom. Wait for one of your health care providers to help you.  · If you feel dizzy or unsure of your footing, return to bed and wait for assistance.  · Avoid being distracted by the TV, telephone, or another person in your room.  · Do not lean or support yourself on rolling objects, such as IV poles or bedside tables.     This information is not intended to replace advice given to you by your health care provider. Make sure you discuss any questions you have with your health care provider.     Document Released: 12/15/2001 Document Revised: 01/08/2016 Document Reviewed: 08/25/2013  [employer]vier Interactive Patient Education ©2016  [employer]       Surgical Site Infections FAQs  What is a Surgical Site Infection (SSI)?  A surgical site infection is an infection that occurs after surgery in the part of the body where the surgery took place. Most patients who have surgery do not develop an infection. However, infections develop in about 1 to 3 out of every 100 patients who have surgery.  Some of the common symptoms of a surgical site infection are:  · Redness and pain around the area where you had surgery  · Drainage of cloudy fluid from your surgical wound  · Fever  Can SSIs be treated?  Yes. Most surgical site infections can be treated with antibiotics. The antibiotic given to you depends on the bacteria (germs) causing the infection. Sometimes patients with SSIs also need another surgery to treat the infection.  What are some of the things that hospitals are doing to prevent SSIs?  To prevent SSIs, doctors, nurses, and other healthcare providers:  · Clean their hands and arms up to their elbows with an antiseptic agent just before the surgery.  · Clean their hands with soap and water or an alcohol-based hand rub before and after caring for each patient.  · May remove some of your hair immediately before your surgery using electric clippers if the hair is in the same area where the procedure will occur. They should not shave you with a razor.  · Wear special hair covers, masks, gowns, and gloves during surgery to keep the surgery area clean.  · Give you antibiotics before your surgery starts. In most cases, you should get antibiotics within 60 minutes before the surgery starts and the antibiotics should be stopped within 24 hours after surgery.  · Clean the skin at the site of your surgery with a special soap that kills germs.  What can I do to help prevent SSIs?  Before your surgery:  · Tell your doctor about other medical problems you may have. Health problems such as allergies, diabetes, and obesity could affect your surgery and your  treatment.  · Quit smoking. Patients who smoke get more infections. Talk to your doctor about how you can quit before your surgery.  · Do not shave near where you will have surgery. Shaving with a razor can irritate your skin and make it easier to develop an infection.  At the time of your surgery:  · Speak up if someone tries to shave you with a razor before surgery. Ask why you need to be shaved and talk with your surgeon if you have any concerns.  · Ask if you will get antibiotics before surgery.  After your surgery:  · Make sure that your healthcare providers clean their hands before examining you, either with soap and water or an alcohol-based hand rub.  · If you do not see your providers clean their hands, please ask them to do so.  · Family and friends who visit you should not touch the surgical wound or dressings.  · Family and friends should clean their hands with soap and water or an alcohol-based hand rub before and after visiting you. If you do not see them clean their hands, ask them to clean their hands.  What do I need to do when I go home from the hospital?  · Before you go home, your doctor or nurse should explain everything you need to know about taking care of your wound. Make sure you understand how to care for your wound before you leave the hospital.  · Always clean your hands before and after caring for your wound.  · Before you go home, make sure you know who to contact if you have questions or problems after you get home.  · If you have any symptoms of an infection, such as redness and pain at the surgery site, drainage, or fever, call your doctor immediately.  If you have additional questions, please ask your doctor or nurse.  Developed and co-sponsored by The Society for Healthcare Epidemiology of [place] (SHEA); Infectious Diseases Society of [place] (IDSA); American Hospital Association; Association for Professionals in Infection Control and Epidemiology (APIC); Centers for Disease  Control and Prevention (CDC); and The Joint Commission.     This information is not intended to replace advice given to you by your health care provider. Make sure you discuss any questions you have with your health care provider.     Document Released: 12/23/2014 Document Revised: 01/08/2016 Document Reviewed: 03/02/2016  [employer] Interactive Patient Education ©2016 Elsevier Inc.       [facility]  CHG 4% Patient Instruction Sheet    Preparing the Skin Before Surgery  Preparing or “prepping” skin before surgery can reduce the risk of infection at the surgical site. To make the process easier,[facility] has chosen 4% Chlorhexidine Gluconate (CHG) antiseptic solution.   The steps below outline the prepping process and should be carefully followed.                                                                                                                                                      Prep the skin at the following time(s):                                                      We recommend you shower the night before surgery, and again the morning of surgery with the 4% CHG antiseptic solution using half of the bottle and a cloth each time.  Dress in clean clothes/sleepwear after showering.  See instructions below for application.          Do not apply any lotions or moisturizers.       Do not shave the area to be prepped for at least 2 days prior to surgery.    Clipping the hair may be done immediately prior to your surgery at the hospital    if needed.    Directions:  Thoroughly rinse your body with water.  Apply 4% CHG to a cloth and wash skin gently, paying special attention to the operative site.  Rinse again thoroughly.  Once you have begun using this product do not apply anything else to your skin. If itching or redness persists, rinse affected areas and discontinue use.    When using this product:  • Keep out of eyes, ears, and mouth.  • If solution should contact these areas, rinse out promptly  and thoroughly with water.  • For external use only.  • Do not use in genital area, on your face or head.      PATIENT/FAMILY/RESPONSIBLE PARTY VERBALIZES UNDERSTANDING OF ABOVE EDUCATION.  COPY OF PAIN SCALE GIVEN AND REVIEWED WITH VERBALIZED UNDERSTANDING.

## 2018-04-09 ENCOUNTER — ANESTHESIA EVENT (OUTPATIENT)
Dept: PERIOP | Facility: HOSPITAL | Age: 78
End: 2018-04-09

## 2018-04-09 ENCOUNTER — HOSPITAL ENCOUNTER (OUTPATIENT)
Facility: HOSPITAL | Age: 78
Setting detail: SURGERY ADMIT
Discharge: HOME | End: 2018-04-09
Attending: NEUROLOGICAL SURGERY | Admitting: NEUROLOGICAL SURGERY

## 2018-04-09 ENCOUNTER — ANESTHESIA (OUTPATIENT)
Dept: PERIOP | Facility: HOSPITAL | Age: 78
End: 2018-04-09

## 2018-04-09 VITALS
TEMPERATURE: 97.8 F | HEART RATE: 92 BPM | DIASTOLIC BLOOD PRESSURE: 57 MMHG | OXYGEN SATURATION: 94 % | SYSTOLIC BLOOD PRESSURE: 93 MMHG | RESPIRATION RATE: 18 BRPM

## 2018-04-09 DIAGNOSIS — G56.21 LESION OF RIGHT ULNAR NERVE: ICD-10-CM

## 2018-04-09 LAB
ABO GROUP BLD: NORMAL
BLD GP AB SCN SERPL QL: NEGATIVE
GLUCOSE BLDC GLUCOMTR-MCNC: 109 MG/DL (ref 70–130)
RH BLD: POSITIVE
T&S EXPIRATION DATE: NORMAL

## 2018-04-09 PROCEDURE — 25010000002 PROPOFOL 10 MG/ML EMULSION: Performed by: NURSE ANESTHETIST, CERTIFIED REGISTERED

## 2018-04-09 PROCEDURE — 86901 BLOOD TYPING SEROLOGIC RH(D): CPT | Performed by: NEUROLOGICAL SURGERY

## 2018-04-09 PROCEDURE — 64718 REVISE ULNAR NERVE AT ELBOW: CPT | Performed by: NEUROLOGICAL SURGERY

## 2018-04-09 PROCEDURE — 25010000002 SUCCINYLCHOLINE PER 20 MG: Performed by: NURSE ANESTHETIST, CERTIFIED REGISTERED

## 2018-04-09 PROCEDURE — 82962 GLUCOSE BLOOD TEST: CPT

## 2018-04-09 PROCEDURE — 25010000002 VANCOMYCIN PER 500 MG: Performed by: NEUROLOGICAL SURGERY

## 2018-04-09 PROCEDURE — 86900 BLOOD TYPING SEROLOGIC ABO: CPT | Performed by: NEUROLOGICAL SURGERY

## 2018-04-09 PROCEDURE — 86850 RBC ANTIBODY SCREEN: CPT | Performed by: NEUROLOGICAL SURGERY

## 2018-04-09 PROCEDURE — 25010000002 FENTANYL CITRATE (PF) 100 MCG/2ML SOLUTION: Performed by: NURSE ANESTHETIST, CERTIFIED REGISTERED

## 2018-04-09 RX ORDER — PHENYLEPHRINE HCL IN 0.9% NACL 0.8MG/10ML
SYRINGE (ML) INTRAVENOUS AS NEEDED
Status: DISCONTINUED | OUTPATIENT
Start: 2018-04-09 | End: 2018-04-09 | Stop reason: SURG

## 2018-04-09 RX ORDER — METOPROLOL TARTRATE 5 MG/5ML
2.5 INJECTION INTRAVENOUS
Status: DISCONTINUED | OUTPATIENT
Start: 2018-04-09 | End: 2018-04-09 | Stop reason: HOSPADM

## 2018-04-09 RX ORDER — BACITRACIN ZINC 500 [USP'U]/G
OINTMENT TOPICAL AS NEEDED
Status: DISCONTINUED | OUTPATIENT
Start: 2018-04-09 | End: 2018-04-09 | Stop reason: HOSPADM

## 2018-04-09 RX ORDER — BUPIVACAINE HYDROCHLORIDE AND EPINEPHRINE 5; 5 MG/ML; UG/ML
INJECTION, SOLUTION PERINEURAL AS NEEDED
Status: DISCONTINUED | OUTPATIENT
Start: 2018-04-09 | End: 2018-04-09 | Stop reason: HOSPADM

## 2018-04-09 RX ORDER — PROPOFOL 10 MG/ML
VIAL (ML) INTRAVENOUS AS NEEDED
Status: DISCONTINUED | OUTPATIENT
Start: 2018-04-09 | End: 2018-04-09 | Stop reason: SURG

## 2018-04-09 RX ORDER — HYDRALAZINE HYDROCHLORIDE 20 MG/ML
5 INJECTION INTRAMUSCULAR; INTRAVENOUS
Status: DISCONTINUED | OUTPATIENT
Start: 2018-04-09 | End: 2018-04-09 | Stop reason: HOSPADM

## 2018-04-09 RX ORDER — LIDOCAINE HYDROCHLORIDE 40 MG/ML
SOLUTION TOPICAL AS NEEDED
Status: DISCONTINUED | OUTPATIENT
Start: 2018-04-09 | End: 2018-04-09 | Stop reason: SURG

## 2018-04-09 RX ORDER — MAGNESIUM HYDROXIDE 1200 MG/15ML
LIQUID ORAL AS NEEDED
Status: DISCONTINUED | OUTPATIENT
Start: 2018-04-09 | End: 2018-04-09 | Stop reason: HOSPADM

## 2018-04-09 RX ORDER — FENTANYL CITRATE 50 UG/ML
INJECTION, SOLUTION INTRAMUSCULAR; INTRAVENOUS AS NEEDED
Status: DISCONTINUED | OUTPATIENT
Start: 2018-04-09 | End: 2018-04-09 | Stop reason: SURG

## 2018-04-09 RX ORDER — SODIUM CHLORIDE, SODIUM LACTATE, POTASSIUM CHLORIDE, CALCIUM CHLORIDE 600; 310; 30; 20 MG/100ML; MG/100ML; MG/100ML; MG/100ML
9 INJECTION, SOLUTION INTRAVENOUS CONTINUOUS
Status: DISCONTINUED | OUTPATIENT
Start: 2018-04-09 | End: 2018-04-09 | Stop reason: HOSPADM

## 2018-04-09 RX ORDER — SODIUM CHLORIDE 0.9 % (FLUSH) 0.9 %
3 SYRINGE (ML) INJECTION AS NEEDED
Status: DISCONTINUED | OUTPATIENT
Start: 2018-04-09 | End: 2018-04-09 | Stop reason: HOSPADM

## 2018-04-09 RX ORDER — FENTANYL CITRATE 50 UG/ML
25 INJECTION, SOLUTION INTRAMUSCULAR; INTRAVENOUS
Status: DISCONTINUED | OUTPATIENT
Start: 2018-04-09 | End: 2018-04-09 | Stop reason: HOSPADM

## 2018-04-09 RX ORDER — SODIUM CHLORIDE, SODIUM LACTATE, POTASSIUM CHLORIDE, CALCIUM CHLORIDE 600; 310; 30; 20 MG/100ML; MG/100ML; MG/100ML; MG/100ML
20 INJECTION, SOLUTION INTRAVENOUS CONTINUOUS
Status: DISCONTINUED | OUTPATIENT
Start: 2018-04-09 | End: 2018-04-09 | Stop reason: HOSPADM

## 2018-04-09 RX ORDER — METOPROLOL TARTRATE 5 MG/5ML
INJECTION INTRAVENOUS AS NEEDED
Status: DISCONTINUED | OUTPATIENT
Start: 2018-04-09 | End: 2018-04-09 | Stop reason: SURG

## 2018-04-09 RX ORDER — VASOPRESSIN 20 U/ML
INJECTION PARENTERAL AS NEEDED
Status: DISCONTINUED | OUTPATIENT
Start: 2018-04-09 | End: 2018-04-09 | Stop reason: SURG

## 2018-04-09 RX ORDER — KETOROLAC TROMETHAMINE 10 MG/1
10 TABLET, FILM COATED ORAL EVERY 6 HOURS PRN
Qty: 20 TABLET | Refills: 0 | Status: SHIPPED | OUTPATIENT
Start: 2018-04-09 | End: 2019-01-15

## 2018-04-09 RX ORDER — LABETALOL HYDROCHLORIDE 5 MG/ML
5 INJECTION, SOLUTION INTRAVENOUS
Status: DISCONTINUED | OUTPATIENT
Start: 2018-04-09 | End: 2018-04-09 | Stop reason: HOSPADM

## 2018-04-09 RX ORDER — HYDROMORPHONE HCL 110MG/55ML
0.5 PATIENT CONTROLLED ANALGESIA SYRINGE INTRAVENOUS
Status: DISCONTINUED | OUTPATIENT
Start: 2018-04-09 | End: 2018-04-09 | Stop reason: HOSPADM

## 2018-04-09 RX ORDER — DEXTROSE MONOHYDRATE 25 G/50ML
12.5 INJECTION, SOLUTION INTRAVENOUS AS NEEDED
Status: DISCONTINUED | OUTPATIENT
Start: 2018-04-09 | End: 2018-04-09 | Stop reason: HOSPADM

## 2018-04-09 RX ORDER — MIDAZOLAM HYDROCHLORIDE 1 MG/ML
1 INJECTION INTRAMUSCULAR; INTRAVENOUS
Status: DISCONTINUED | OUTPATIENT
Start: 2018-04-09 | End: 2018-04-09 | Stop reason: HOSPADM

## 2018-04-09 RX ORDER — LIDOCAINE HYDROCHLORIDE 20 MG/ML
INJECTION, SOLUTION INFILTRATION; PERINEURAL AS NEEDED
Status: DISCONTINUED | OUTPATIENT
Start: 2018-04-09 | End: 2018-04-09 | Stop reason: SURG

## 2018-04-09 RX ORDER — SODIUM CHLORIDE 0.9 % (FLUSH) 0.9 %
1-10 SYRINGE (ML) INJECTION AS NEEDED
Status: DISCONTINUED | OUTPATIENT
Start: 2018-04-09 | End: 2018-04-09 | Stop reason: HOSPADM

## 2018-04-09 RX ORDER — MEPERIDINE HYDROCHLORIDE 25 MG/ML
12.5 INJECTION INTRAMUSCULAR; INTRAVENOUS; SUBCUTANEOUS
Status: DISCONTINUED | OUTPATIENT
Start: 2018-04-09 | End: 2018-04-09 | Stop reason: HOSPADM

## 2018-04-09 RX ORDER — IPRATROPIUM BROMIDE AND ALBUTEROL SULFATE 2.5; .5 MG/3ML; MG/3ML
3 SOLUTION RESPIRATORY (INHALATION) ONCE AS NEEDED
Status: DISCONTINUED | OUTPATIENT
Start: 2018-04-09 | End: 2018-04-09 | Stop reason: HOSPADM

## 2018-04-09 RX ORDER — ONDANSETRON 2 MG/ML
4 INJECTION INTRAMUSCULAR; INTRAVENOUS ONCE AS NEEDED
Status: DISCONTINUED | OUTPATIENT
Start: 2018-04-09 | End: 2018-04-09 | Stop reason: HOSPADM

## 2018-04-09 RX ORDER — SUCCINYLCHOLINE CHLORIDE 20 MG/ML
INJECTION INTRAMUSCULAR; INTRAVENOUS AS NEEDED
Status: DISCONTINUED | OUTPATIENT
Start: 2018-04-09 | End: 2018-04-09 | Stop reason: SURG

## 2018-04-09 RX ORDER — DIPHENHYDRAMINE HYDROCHLORIDE 50 MG/ML
12.5 INJECTION INTRAMUSCULAR; INTRAVENOUS
Status: DISCONTINUED | OUTPATIENT
Start: 2018-04-09 | End: 2018-04-09 | Stop reason: HOSPADM

## 2018-04-09 RX ORDER — MIDAZOLAM HYDROCHLORIDE 1 MG/ML
2 INJECTION INTRAMUSCULAR; INTRAVENOUS
Status: DISCONTINUED | OUTPATIENT
Start: 2018-04-09 | End: 2018-04-09 | Stop reason: HOSPADM

## 2018-04-09 RX ORDER — NALOXONE HCL 0.4 MG/ML
0.4 VIAL (ML) INJECTION AS NEEDED
Status: DISCONTINUED | OUTPATIENT
Start: 2018-04-09 | End: 2018-04-09 | Stop reason: HOSPADM

## 2018-04-09 RX ORDER — ASPIRIN 81 MG/1
81 TABLET ORAL DAILY
Start: 2018-04-09 | End: 2019-01-24 | Stop reason: HOSPADM

## 2018-04-09 RX ORDER — ROCURONIUM BROMIDE 10 MG/ML
INJECTION, SOLUTION INTRAVENOUS AS NEEDED
Status: DISCONTINUED | OUTPATIENT
Start: 2018-04-09 | End: 2018-04-09 | Stop reason: SURG

## 2018-04-09 RX ADMIN — ROCURONIUM BROMIDE 10 MG: 10 INJECTION INTRAVENOUS at 09:59

## 2018-04-09 RX ADMIN — LIDOCAINE HYDROCHLORIDE 100 MG: 20 INJECTION, SOLUTION INFILTRATION; PERINEURAL at 09:59

## 2018-04-09 RX ADMIN — LIDOCAINE HYDROCHLORIDE 100 MG: 20 INJECTION, SOLUTION INFILTRATION; PERINEURAL at 10:07

## 2018-04-09 RX ADMIN — LIDOCAINE HYDROCHLORIDE 1 EACH: 40 SOLUTION TOPICAL at 09:59

## 2018-04-09 RX ADMIN — METOPROLOL TARTRATE 2.5 MG: 5 INJECTION INTRAVENOUS at 11:43

## 2018-04-09 RX ADMIN — VASOPRESSIN 2 UNITS: 20 INJECTION INTRAVENOUS at 10:25

## 2018-04-09 RX ADMIN — EPHEDRINE SULFATE 25 MG: 50 INJECTION INTRAMUSCULAR; INTRAVENOUS; SUBCUTANEOUS at 10:20

## 2018-04-09 RX ADMIN — VANCOMYCIN HYDROCHLORIDE 2000 MG: 1 INJECTION, POWDER, LYOPHILIZED, FOR SOLUTION INTRAVENOUS at 09:28

## 2018-04-09 RX ADMIN — FENTANYL CITRATE 100 MCG: 50 INJECTION, SOLUTION INTRAMUSCULAR; INTRAVENOUS at 09:59

## 2018-04-09 RX ADMIN — LIDOCAINE HYDROCHLORIDE 0.5 ML: 10 INJECTION, SOLUTION EPIDURAL; INFILTRATION; INTRACAUDAL; PERINEURAL at 08:49

## 2018-04-09 RX ADMIN — VASOPRESSIN 2 UNITS: 20 INJECTION INTRAVENOUS at 10:10

## 2018-04-09 RX ADMIN — SUCCINYLCHOLINE CHLORIDE 100 MG: 20 INJECTION, SOLUTION INTRAMUSCULAR; INTRAVENOUS at 09:59

## 2018-04-09 RX ADMIN — SODIUM CHLORIDE, POTASSIUM CHLORIDE, SODIUM LACTATE AND CALCIUM CHLORIDE 20 ML/HR: 600; 310; 30; 20 INJECTION, SOLUTION INTRAVENOUS at 08:51

## 2018-04-09 RX ADMIN — VASOPRESSIN 2 UNITS: 20 INJECTION INTRAVENOUS at 10:03

## 2018-04-09 RX ADMIN — Medication 160 MCG: at 10:06

## 2018-04-09 RX ADMIN — EPHEDRINE SULFATE 25 MG: 50 INJECTION INTRAMUSCULAR; INTRAVENOUS; SUBCUTANEOUS at 10:27

## 2018-04-09 RX ADMIN — Medication 160 MCG: at 10:20

## 2018-04-09 RX ADMIN — PROPOFOL 150 MG: 10 INJECTION, EMULSION INTRAVENOUS at 09:59

## 2018-04-09 NOTE — H&P (VIEW-ONLY)
SUBJECTIVE:  Patient ID: Ross Chandra is a 77 y.o. male is here today for follow-up.    Chief Complaint: Right hand numbness  Chief Complaint   Patient presents with   • Right hand numbness     patient here to discuss results of RUE EMG/NCV from [facility] [place] 3/1/18       HPI  77-year-old gentleman with a past medical history of an extensive anterior cervical fusion involving 45 levels.  He presents with a several week history of gradual worsening numbness and tingling in the fourth and fifth fingers and the ulnar aspect of his right hand as well as weakness and wasting.  He has chronic neck pain is been no changes to his neck pain.  He does not describe her admit any radicular type pain in the right arm.  He does have an essential tremor on the right arm he is wheelchair bound from previous stroke.  He is right-handed.    The following portions of the patient's history were reviewed and updated as appropriate: allergies, current medications, past family history, past medical history, past social history, past surgical history and problem list.    OBJECTIVE:    Review of Systems   Neurological: Positive for weakness and numbness.   All other systems reviewed and are negative.         Physical Exam   Constitutional: He is oriented to person, place, and time.   Neurological: He is oriented to person, place, and time. He has a normal Finger-Nose-Finger Test.   Psychiatric: His speech is normal.       Neurologic Exam     Mental Status   Oriented to person, place, and time.   Attention: normal.   Speech: speech is normal   Level of consciousness: alert  Knowledge: good.     Cranial Nerves   Cranial nerves II through XII intact.     Motor Exam   Muscle bulk: decreased  Overall muscle tone: normal  Right arm pronator drift: absent  Left arm pronator drift: absent    Strength   Strength 5/5 except as noted.   Right interossei: 4/5  Left interossei: 5/5Hyperthenar wasting on the right, severe wasting in the webspace between  the thumb and first finger on the right.     Sensory Exam   Right arm light touch: Diminished sensation to light touch and sharp in an ulnar distribution of the right hand.  Left arm light touch: normal  Left leg light touch: normal  Left arm pinprick: normal  Right leg pinprick: normal  Left leg pinprick: normal    Gait, Coordination, and Reflexes     Gait  Gait: (Nonambulatory from previous stroke)    Coordination   Finger to nose coordination: normal    Tremor   Resting tremor: absent  Intention tremor: present  Action tremor: right arm    Reflexes   Reflexes 2+ except as noted.       Independent Review of Radiographic Studies:   EMG nerve conduction study is consistent with a right ulnar neuropathy, cubital tunnel syndrome on the right    ASSESSMENT/PLAN:  The patient has numbness and tingling in an ulnar distribution in the right hand along with wasting and weakness consistent with an ulnar neuropathy.  Nerve conduction studies consistent with cubital tunnel syndrome.  My recommendation would be ulnar nerve release at the right elbow.  The risks and benefits of the procedure were discussed at length which included but were not limited to infection, bleeding, damage to the ulnar nerve, permanent pain numbness tingling and weakness in the right hand, stroke, coma, and death.  The patient and his family acknowledged understanding of this.  Their questions and concerns were addressed.  We will get him prepped and scheduled as soon as possible.      1. Lesion of right ulnar nerve    2. Degeneration of cervical intervertebral disc    3. BMI 38.0-38.9,adult    4. Non-tobacco user            Return for postoperatively.      Jorge Luis Amos MD

## 2018-04-09 NOTE — ANESTHESIA PREPROCEDURE EVALUATION
Anesthesia Evaluation     Patient summary reviewed   no history of anesthetic complications:  NPO Solid Status: > 8 hours             Airway   Mallampati: III  TM distance: >3 FB  Neck ROM: full  Dental          Pulmonary    (-) asthma, sleep apnea, not a smoker  Cardiovascular     ECG reviewed    (+) hypertension, hyperlipidemia,   (-) pacemaker, past MI, angina, cardiac stents      Neuro/Psych  (+) CVA (2013) residual symptoms,     (-) seizures  GI/Hepatic/Renal/Endo    (+) morbid obesity, GERD,  renal disease CRI, diabetes mellitus,   (-) liver disease    Musculoskeletal     Abdominal    Substance History      OB/GYN          Other                        Anesthesia Plan    ASA 3     general     intravenous induction   Anesthetic plan and risks discussed with patient.

## 2018-04-09 NOTE — DISCHARGE INSTRUCTIONS

## 2018-04-09 NOTE — OP NOTE
Procedure Note  Preop Diagnosis: Lesion of right ulnar nerve [G56.21]    Post-Op Diagnosis Codes:     * Lesion of right ulnar nerve [G56.21]     Procedure Name:Right ulnar nerve decompression    Indications:  A Clinical exam and nerve conduction study revealed findings of right ulnar nerve syndrome. The patient now presents for decompression at the cubital tunnel after discussing therapeutic alternatives.          Surgeon: Jorge Luis Amos MD     Assistants: None    Anesthesia: General LMA anesthesia    ASA Class: 3    Procedure Details   After obtaining informed consent, having the risks and benefits of the procedure explained including but not limited to infection, bleeding, paralysis, damage to the ulnar nerve resulting in hand weakness, pain, numbness and tingling, stroke, coma, and death.  The patient was brought to the operating.  He was given general anesthesia via an LMA.  His right hand was placed on a hand table.  The right medial condyle was palpated and identified a preplanned incision was marked on the ulnar portion of the right arm.  The patient was then prepped and draped in a standard sterile fashion.  The preplanned incision was infiltrated Marcaine and epinephrine.  A 10 blade scalpel was used to make incisions the dermis and epidermis.  Bovie cautery was used to extend the incision to the subcutaneous tissues.  Tenotomy scissors were then used to bluntly and sharply dissect through the connective tissue and fascia down to the medial epicondyles and then the ulnar nerve was identified through palpation and then delicately exposed using blunt and sharp dissection using tenotomy scissors.  The nerve was then released proximally and distally the fascial bands proximally and distally were released sharply using tenotomy scissors.  The nerve was then circumferentially released from the surrounding connective tissues.  Once are satisfied that we had adequately decompressed the entire nerve the wound  was copiously irrigated with antibiotic solution.  It was inspected for hemostasis.  The subcutaneous tissues were closed using a series of inverted interrupted 0 Vicryl sutures.  And the skin was closed using a running 4-0 Monocryl.  All sponge needle and instrument counts were correct at the end of the procedure.  The patient was extubated in stable condition returned recovery with about 10 mL of blood loss.    Findings:  Right cubital tunnel syndrome]    Estimated Blood Loss:  10           Drains: None           Total IV Fluids: ml           Specimens: None           Implants: * No implants in log *           Complications:  None           Disposition: PACU - hemodynamically stable.           Condition: stable        Jorge Luis Amos MD

## 2018-04-09 NOTE — ANESTHESIA POSTPROCEDURE EVALUATION
Patient: Ross Chandra    Procedure Summary     Date:  04/09/18 Room / Location:   [facility] OR  /  [facility] OR    Anesthesia Start:  0954 Anesthesia Stop:  1148    Procedure:  ULNAR NERVE DECOMPRESSION RIGHT (Right Elbow) Diagnosis:       Lesion of right ulnar nerve      (Lesion of right ulnar nerve [G56.21])    Surgeon:  Jorge Luis Amos MD Provider:  TERESA Arevalo CRNA    Anesthesia Type:  general ASA Status:  3          Anesthesia Type: general  Last vitals  BP   105/62 (04/09/18 1230)   Temp   97.8 °F (36.6 °C) (04/09/18 1200)   Pulse   92 (04/09/18 1230)   Resp   17 (04/09/18 1230)     SpO2   92 % (04/09/18 1230)     Post Anesthesia Care and Evaluation    Patient location during evaluation: PACU  Patient participation: complete - patient participated  Level of consciousness: awake and alert  Pain management: adequate  Airway patency: patent  Anesthetic complications: No anesthetic complications  PONV Status: controlled  Cardiovascular status: acceptable and hemodynamically stable  Respiratory status: acceptable  Hydration status: acceptable    Comments: Patient discharged from PACU prior to anesthesia evaluation based on Rhoda Score.  For details, see RN note.     /62   Pulse 92   Temp 97.8 °F (36.6 °C)   Resp 17   SpO2 92%

## 2018-04-09 NOTE — ANESTHESIA PROCEDURE NOTES
Airway  Urgency: elective    Airway not difficult    General Information and Staff    Patient location during procedure: OR  CRNA: TERESA FUENTES    Indications and Patient Condition  Indications for airway management: airway protection    Preoxygenated: yes  MILS maintained throughout  Mask difficulty assessment: 1 - vent by mask    Final Airway Details  Final airway type: endotracheal airway      Successful airway: ETT  Cuffed: yes   Successful intubation technique: direct laryngoscopy  Facilitating devices/methods: intubating stylet  Endotracheal tube insertion site: oral  Blade: Richter  Blade size: #2  ETT size: 7.5 mm  Cormack-Lehane Classification: grade I - full view of glottis  Placement verified by: chest auscultation and capnometry   Cuff volume (mL): 8  Measured from: lips  ETT to lips (cm): 22  Number of attempts at approach: 1

## 2018-04-30 ENCOUNTER — OFFICE VISIT (OUTPATIENT)
Dept: NEUROSURGERY | Facility: CLINIC | Age: 78
End: 2018-04-30

## 2018-04-30 VITALS
HEIGHT: 68 IN | DIASTOLIC BLOOD PRESSURE: 68 MMHG | BODY MASS INDEX: 44.86 KG/M2 | SYSTOLIC BLOOD PRESSURE: 138 MMHG | WEIGHT: 296 LBS

## 2018-04-30 DIAGNOSIS — M50.30 DEGENERATION OF CERVICAL INTERVERTEBRAL DISC: ICD-10-CM

## 2018-04-30 DIAGNOSIS — Z78.9 NON-TOBACCO USER: ICD-10-CM

## 2018-04-30 DIAGNOSIS — G56.21 LESION OF RIGHT ULNAR NERVE: Primary | ICD-10-CM

## 2018-04-30 PROCEDURE — 99024 POSTOP FOLLOW-UP VISIT: CPT | Performed by: NURSE PRACTITIONER

## 2018-04-30 NOTE — PATIENT INSTRUCTIONS

## 2018-04-30 NOTE — PROGRESS NOTES
"    Chief complaint:   Chief Complaint   Patient presents with   • Post-op     Post op visit for a ulnar nerve srugery on 04/09/18, he states he is doing fine with it and has no pain.          Subjective     HPI: This is a 77-year-old male gentleman who went to the operating room on 04/09/2018 for a ulnar nerve decompression.  He is here in follow-up today.  He states that he thinks that his arm is doing better since that surgery it was done.  He thinks that is get more mobility and more muscle strength in it.  He is still weak in the arm but he does feel like he is making improvements at this time.    Review of Systems   Musculoskeletal: Negative.    Neurological: Positive for tremors, weakness and numbness.         Objective      Vital Signs  /68 (BP Location: Left arm, Patient Position: Sitting)   Ht 172.7 cm (68\")   Wt 134 kg (296 lb)   BMI 45.01 kg/m²     Physical Exam   Constitutional: He is oriented to person, place, and time. He appears well-developed and well-nourished.   HENT:   Head: Normocephalic.   Eyes: EOM are normal. Pupils are equal, round, and reactive to light.   Neck: Normal range of motion.   Pulmonary/Chest: Effort normal.   Musculoskeletal: Normal range of motion.   Neurological: He is alert and oriented to person, place, and time. He has normal strength and normal reflexes. No cranial nerve deficit or sensory deficit. Gait abnormal. GCS eye subscore is 4. GCS verbal subscore is 5. GCS motor subscore is 6.   Patient presents in a wheelchair and does use a walker.   Skin: Skin is warm.   Incision clean dry and intact   Psychiatric: He has a normal mood and affect. His speech is normal and behavior is normal. Thought content normal.       Results Review: No new imaging          Assessment/Plan: At this point the patient.  She doing very well.  There is still some muscle wasting in his hand.  He is going to get something to try continue working with his hand does it can get the weakness " improved.  We will follow-up again with him in 6 weeks.  The patient does have trouble with a tremor on the right side.  He is gonna talk to Dr. Amos his next appointment about being a possible candidate for DBS.  BMI shows that is very overweight.  BMI chart was given the patient.  He is a nonsmoker.         Ross was seen today for post-op.    Diagnoses and all orders for this visit:    Lesion of right ulnar nerve    Degeneration of cervical intervertebral disc    Non-tobacco user    BMI 45.0-49.9, adult        I discussed the patients findings and my recommendations with patient  Haider Gray, APRN  04/30/18  11:48 AM

## 2018-06-04 ENCOUNTER — TELEPHONE (OUTPATIENT)
Dept: VASCULAR SURGERY | Facility: CLINIC | Age: 78
End: 2018-06-04

## 2018-06-04 NOTE — TELEPHONE ENCOUNTER
Left message reminding Mr Chandra of his appointments tomorrow. Reminded [facility] for testing and follow up afterwards with Dr Pisano at 1130 am. Also advised if he had any questions or needed to reschedule to please call the office at [phone].

## 2018-07-05 ENCOUNTER — OFFICE VISIT (OUTPATIENT)
Dept: NEUROSURGERY | Facility: CLINIC | Age: 78
End: 2018-07-05

## 2018-07-05 VITALS — HEIGHT: 68 IN | BODY MASS INDEX: 44.86 KG/M2 | WEIGHT: 296 LBS

## 2018-07-05 DIAGNOSIS — G56.21 LESION OF RIGHT ULNAR NERVE: Primary | ICD-10-CM

## 2018-07-05 DIAGNOSIS — Z78.9 NON-TOBACCO USER: ICD-10-CM

## 2018-07-05 PROCEDURE — 99024 POSTOP FOLLOW-UP VISIT: CPT | Performed by: NEUROLOGICAL SURGERY

## 2018-07-05 RX ORDER — METOLAZONE 2.5 MG/1
TABLET ORAL
COMMUNITY
Start: 2018-04-03 | End: 2019-01-15

## 2018-07-05 NOTE — PATIENT INSTRUCTIONS
PATIENT TO CONTINUE TO FOLLOW UP WITH HIS PRIMARY CARE PROVIDER FOR YEARLY PHYSICAL EXAMS TO ENSURE COMPLETE HEALTH MAINTENANCE.      BMI for Adults  Body mass index (BMI) is a number that is calculated from a person's weight and height. In most adults, the number is used to find how much of an adult's weight is made up of fat. BMI is not as accurate as a direct measure of body fat.  How is BMI calculated?  BMI is calculated by dividing weight in kilograms by height in meters squared. It can also be calculated by dividing weight in pounds by height in inches squared, then multiplying the resulting number by 703. Charts are available to help you find your BMI quickly and easily without doing this calculation.  How is BMI interpreted?  Health care professionals use BMI charts to identify whether an adult is underweight, at a normal weight, or overweight based on the following guidelines:  · Underweight: BMI less than 18.5.  · Normal weight: BMI between 18.5 and 24.9.  · Overweight: BMI between 25 and 29.9.  · Obese: BMI of 30 and above.    BMI is usually interpreted the same for males and females.  Weight includes both fat and muscle, so someone with a muscular build, such as an athlete, may have a BMI that is higher than 24.9. In cases like these, BMI may not accurately depict body fat. To determine if excess body fat is the cause of a BMI of 25 or higher, further assessments may need to be done by a health care provider.  Why is BMI a useful tool?  BMI is used to identify a possible weight problem that may be related to a medical problem or may increase the risk for medical problems. BMI can also be used to promote changes to reach a healthy weight.  This information is not intended to replace advice given to you by your health care provider. Make sure you discuss any questions you have with your health care provider.  Document Released: 08/29/2005 Document Revised: 04/27/2017 Document Reviewed: 05/15/2015  [employer]  Interactive Patient Education © 2018 [employer]

## 2018-07-05 NOTE — PROGRESS NOTES
SUBJECTIVE:  Patient ID: Ross Chandra is a 77 y.o. male is here today for follow-up.    Chief Complaint:ulnar nerve   Chief Complaint   Patient presents with   • Right ulnar neuropathy     patient underwent RT ulnar nerve decompression on 4/9/18; patient states his symptoms are improved.  Now patient is having pain between his shoulder blades.       HPI  77-year-old gentleman who went to the operating room 04/09/2018 for a right ulnar nerve decompression.  He is doing very well.  He says the pain numbness and tingling in his right hand is gotten better.  He says it feels stronger.  He is questioning further treatments for his right upper extremity essential tremor which she has had for several years.  He is under the care of a neurologist from [place].  He is on primidone with escalating doses.  Right now he really cannot use that hand for everyday activities because of tremor.    The following portions of the patient's history were reviewed and updated as appropriate: allergies, current medications, past family history, past medical history, past social history, past surgical history and problem list.    OBJECTIVE:    Review of Systems   Neurological: Positive for tremors.   All other systems reviewed and are negative.         Physical Exam   Constitutional: He is oriented to person, place, and time.   Neurological: He is oriented to person, place, and time.   Psychiatric: His speech is normal.       Neurologic Exam     Mental Status   Oriented to person, place, and time.   Attention: normal.   Speech: speech is normal   Level of consciousness: alert  Knowledge: good.     Cranial Nerves   Cranial nerves II through XII intact.     Motor Exam   Muscle bulk: normal  Overall muscle tone: normal  Right arm pronator drift: absent  Left arm pronator drift: absent    Strength   Right deltoid: 5/5  Left deltoid: 5/5  Right biceps: 5/5  Left biceps: 5/5  Right triceps: 5/5  Left triceps: 5/5  Right interossei:  5/5  Left interossei: 5/5    Sensory Exam   Light touch normal.   Pinprick normal.    right upper extremity intention tremor and action tremor.  No resting component  Wasting of the right hyperthenar eminence and webspace between the thumb and first finger    Independent Review of Radiographic Studies:       ASSESSMENT/PLAN:  1.  Ulnar neuropathy.  The patient is done very well after his surgery.  His strength is improved and he says the discomfort in his right hand is significantly improved.  We will see him in follow-up in 3 months for this issue2  2.  Right upper extremity essential tremor.  The tremor is becoming refractory to medication and he is having trouble with everyday activities.  He may be a candidate for deep brain stimulation electrode placement.  We discussed this at length.  He is scheduled to see his movement disorder neurologist in September.  We will follow up after that visit to discuss options concerning his tremor.    1. Lesion of right ulnar nerve    2. Non-tobacco user    3. BMI 45.0-49.9, adult (CMS/[facility])            No Follow-up on file.      Jorge Luis Amos MD

## 2018-10-25 ENCOUNTER — OFFICE VISIT (OUTPATIENT)
Dept: NEUROSURGERY | Facility: CLINIC | Age: 78
End: 2018-10-25

## 2018-10-25 VITALS
SYSTOLIC BLOOD PRESSURE: 158 MMHG | BODY MASS INDEX: 40.46 KG/M2 | DIASTOLIC BLOOD PRESSURE: 80 MMHG | WEIGHT: 289 LBS | HEIGHT: 71 IN

## 2018-10-25 DIAGNOSIS — G56.21 LESION OF RIGHT ULNAR NERVE: ICD-10-CM

## 2018-10-25 DIAGNOSIS — Z78.9 NON-TOBACCO USER: ICD-10-CM

## 2018-10-25 DIAGNOSIS — R55 SYNCOPE AND COLLAPSE: ICD-10-CM

## 2018-10-25 DIAGNOSIS — M50.30 DEGENERATION OF CERVICAL INTERVERTEBRAL DISC: Primary | ICD-10-CM

## 2018-10-25 DIAGNOSIS — M51.37 DEGENERATION OF LUMBAR OR LUMBOSACRAL INTERVERTEBRAL DISC: ICD-10-CM

## 2018-10-25 DIAGNOSIS — R25.1 TREMOR: ICD-10-CM

## 2018-10-25 PROCEDURE — 99214 OFFICE O/P EST MOD 30 MIN: CPT | Performed by: NEUROLOGICAL SURGERY

## 2018-10-25 NOTE — PROGRESS NOTES
SUBJECTIVE:  Patient ID: Ross Chandra is a 77 y.o. male is here today for follow-up.    Chief Complaint: Ulnar nerve entrapment, essential tremor  Chief Complaint   Patient presents with   • Neck & back pain     patient is here for a 3 month follow up   • Tremors     patient would like to discuss DBS placement       HPI  77-year-old gentleman that underwent R ulnar nerve decompression about 3 months ago.  He is in very well his pain numbness and tingling in strength in that hand of all markedly improved.  He is very satisfied with the results that surgery.  He has been on escalating dose of primidone and under the direction of movement disorder specialist for about a year.  He does not feel that his tremors getting  better.  He is getting increasingly disabled with his right hand.  He is right-handed dominant.  He can only drink with a covered cup and a straw.  He does not use the right hand or right or use any utensils.  He has a mild intention tremor with the left hand but is very functional with the left hand.    The following portions of the patient's history were reviewed and updated as appropriate: allergies, current medications, past family history, past medical history, past social history, past surgical history and problem list.    OBJECTIVE:    Review of Systems   Musculoskeletal: Positive for gait problem.   Neurological: Positive for tremors.   All other systems reviewed and are negative.         Physical Exam   Constitutional: He is oriented to person, place, and time.   Neurological: He is oriented to person, place, and time. He has normal strength.   Psychiatric: His speech is normal.       Neurologic Exam     Mental Status   Oriented to person, place, and time.   Attention: normal.   Speech: speech is normal   Level of consciousness: alert    Cranial Nerves   Cranial nerves II through XII intact.     Motor Exam   Muscle bulk: normal  Overall muscle tone: normal  Right arm pronator drift:  absent  Left arm pronator drift: absent    Strength   Strength 5/5 throughout.     Sensory Exam   Light touch normal.   Pinprick normal.     Gait, Coordination, and Reflexes     Gait  Gait: (Unsteady shuffling)Some mild resting tremor in the right upper extremity significant right upper extremity intention and action tremor.           Independent Review of Radiographic Studies:       ASSESSMENT/PLAN:  Ross has right much greater than left essential tremor.  He has been on oscillating doses of primidone with a movement disorder specialist.  He has not seen any improvement in his tremor.  At this point his right hand is essentially nonfunctional for practical everyday tasks.  I think he would be a good candidate for consideration for deep brain stimulator were discussed this with his neuro movement disorder specialist we will go ahead and order the DBS protocol MRI and coordinate with [employer] to start to set up the surgical process.  We will see him in follow-up after the MRI is completed a mixed talk to his movement disorder specialist      1. Degeneration of cervical intervertebral disc    2. Degeneration of lumbar or lumbosacral intervertebral disc    3. Lesion of right ulnar nerve    4. Syncope and collapse    5. Tremor    6. Non-tobacco user    7. BMI 40.0-44.9, adult (CMS/HCC)            Return in about 4 weeks (around 11/22/2018).      Jorge Luis Amos MD

## 2018-10-25 NOTE — PATIENT INSTRUCTIONS
PATIENT TO CONTINUE TO FOLLOW UP WITH HIS PRIMARY CARE PROVIDER FOR YEARLY PHYSICAL EXAMS TO ENSURE COMPLETE HEALTH MAINTENANCE      BMI for Adults  Body mass index (BMI) is a number that is calculated from a person's weight and height. In most adults, the number is used to find how much of an adult's weight is made up of fat. BMI is not as accurate as a direct measure of body fat.  How is BMI calculated?  BMI is calculated by dividing weight in kilograms by height in meters squared. It can also be calculated by dividing weight in pounds by height in inches squared, then multiplying the resulting number by 703. Charts are available to help you find your BMI quickly and easily without doing this calculation.  How is BMI interpreted?  Health care professionals use BMI charts to identify whether an adult is underweight, at a normal weight, or overweight based on the following guidelines:  · Underweight: BMI less than 18.5.  · Normal weight: BMI between 18.5 and 24.9.  · Overweight: BMI between 25 and 29.9.  · Obese: BMI of 30 and above.    BMI is usually interpreted the same for males and females.  Weight includes both fat and muscle, so someone with a muscular build, such as an athlete, may have a BMI that is higher than 24.9. In cases like these, BMI may not accurately depict body fat. To determine if excess body fat is the cause of a BMI of 25 or higher, further assessments may need to be done by a health care provider.  Why is BMI a useful tool?  BMI is used to identify a possible weight problem that may be related to a medical problem or may increase the risk for medical problems. BMI can also be used to promote changes to reach a healthy weight.  This information is not intended to replace advice given to you by your health care provider. Make sure you discuss any questions you have with your health care provider.  Document Released: 08/29/2005 Document Revised: 04/27/2017 Document Reviewed: 05/15/2015  [employer]  Interactive Patient Education © 2018 [employer].

## 2018-11-19 ENCOUNTER — HOSPITAL ENCOUNTER (OUTPATIENT)
Dept: MRI IMAGING | Facility: HOSPITAL | Age: 78
Discharge: HOME | End: 2018-11-19
Attending: NEUROLOGICAL SURGERY | Admitting: NEUROLOGICAL SURGERY

## 2018-11-19 DIAGNOSIS — R25.1 TREMOR: ICD-10-CM

## 2018-11-19 LAB — CREAT BLDA-MCNC: 1.5 MG/DL (ref 0.6–1.3)

## 2018-11-19 PROCEDURE — 82565 ASSAY OF CREATININE: CPT

## 2018-11-19 PROCEDURE — A9577 INJ MULTIHANCE: HCPCS | Performed by: NEUROLOGICAL SURGERY

## 2018-11-19 PROCEDURE — 70553 MRI BRAIN STEM W/O & W/DYE: CPT

## 2018-11-19 PROCEDURE — 0 GADOBENATE DIMEGLUMINE 529 MG/ML SOLUTION: Performed by: NEUROLOGICAL SURGERY

## 2018-11-19 RX ADMIN — GADOBENATE DIMEGLUMINE 10 ML: 529 INJECTION, SOLUTION INTRAVENOUS at 11:16

## 2018-11-27 ENCOUNTER — OFFICE VISIT (OUTPATIENT)
Dept: NEUROSURGERY | Facility: CLINIC | Age: 78
End: 2018-11-27

## 2018-11-27 VITALS
SYSTOLIC BLOOD PRESSURE: 138 MMHG | BODY MASS INDEX: 41.44 KG/M2 | WEIGHT: 296 LBS | HEIGHT: 71 IN | DIASTOLIC BLOOD PRESSURE: 70 MMHG

## 2018-11-27 DIAGNOSIS — Z78.9 NON-TOBACCO USER: ICD-10-CM

## 2018-11-27 DIAGNOSIS — R25.1 TREMOR: Primary | ICD-10-CM

## 2018-11-27 PROCEDURE — 99213 OFFICE O/P EST LOW 20 MIN: CPT | Performed by: NEUROLOGICAL SURGERY

## 2018-11-27 RX ORDER — CLOPIDOGREL BISULFATE 75 MG/1
75 TABLET ORAL DAILY
COMMUNITY
Start: 2018-10-10 | End: 2019-01-24 | Stop reason: HOSPADM

## 2018-11-27 RX ORDER — ATORVASTATIN CALCIUM 40 MG/1
40 TABLET, FILM COATED ORAL DAILY
COMMUNITY

## 2018-11-27 RX ORDER — LATANOPROST 50 UG/ML
SOLUTION/ DROPS OPHTHALMIC
COMMUNITY
End: 2019-01-15

## 2018-11-27 RX ORDER — FENOFIBRATE 134 MG/1
134 CAPSULE ORAL
COMMUNITY

## 2018-11-27 RX ORDER — LEVOTHYROXINE SODIUM 0.05 MG/1
50 TABLET ORAL DAILY
COMMUNITY

## 2018-11-27 NOTE — PROGRESS NOTES
SUBJECTIVE:  Patient ID: Ross Chandra is a 78 y.o. male is here today for follow-up.    Chief Complaint:essential tremor  Chief Complaint   Patient presents with   • Tremors     patient is here to discuss DBS placement; patient had MRI @ [facility] on 11/19/18.       HPI  77 yo male s/p ulnar nerve decompression April 2018.  He is done very well with that.  His right hand pain numbness and tingling is all markedly improved.  He feels his strength is better.  He still continues to have the right upper extremity essential tremor which is been refractory to medical management.  We sent him for a planning MRI he is here to discuss the results.    The following portions of the patient's history were reviewed and updated as appropriate: allergies, current medications, past family history, past medical history, past social history, past surgical history and problem list.    OBJECTIVE:    Review of Systems   Musculoskeletal: Positive for back pain.   Neurological: Positive for tremors.   All other systems reviewed and are negative.         Physical Exam    Neurologic Exam  Mental Status   Oriented to person, place, and time.   Attention: normal.   Speech: speech is normal   Level of consciousness: alert     Cranial Nerves   Cranial nerves II through XII intact.      Motor Exam   Muscle bulk: normal  Overall muscle tone: normal  Right arm pronator drift: absent  Left arm pronator drift: absent     Strength   Strength 5/5 throughout.      Sensory Exam   Light touch normal.   Pinprick normal.      Gait, Coordination, and Reflexes      Gait  Gait: (Unsteady shuffling)Some mild resting tremor in the right upper extremity significant right upper extremity intention and action tremor.       Independent Review of Radiographic Studies:   MRI of the brain with and without contrast done with DBS protocol as satisfactory    ASSESSMENT/PLAN:  Mr. Chandra has a severely disabling right upper extremity essential tremor.  He has been  refractory to medical management up to this point.  I think would be an excellent candidate for DBS to better control his essential tremor.  He only needs a unilateral lead placed.  We discussed the 3 step protocol.  We extensively discussed the expectations he should have for this surgery.  We also discussed risks and benefits which included but were not limited to infection, bleeding, paralysis, spinal fluid leak, bowel bladder incontinence, stroke, coma, and death.  He acknowledged understanding of this.  His questions concerns were addressed.  We will get him prepped and scheduled for fiducial placement and lead placement then battery placement.      1. Tremor    2. BMI 38.0-38.9,adult    3. Non-tobacco user            No Follow-up on file.      Jorge Luis Amos MD

## 2018-11-27 NOTE — H&P
SUBJECTIVE:  Patient ID: Ross Chandra is a 78 y.o. male is here today for follow-up.    Chief Complaint:essential tremor  Chief Complaint   Patient presents with   • Tremors     patient is here to discuss DBS placement; patient had MRI @ [facility] on 11/19/18.       HPI  77 yo male s/p ulnar nerve decompression April 2018.  He is done very well with that.  His right hand pain numbness and tingling is all markedly improved.  He feels his strength is better.  He still continues to have the right upper extremity essential tremor which is been refractory to medical management.  We sent him for a planning MRI he is here to discuss the results.    The following portions of the patient's history were reviewed and updated as appropriate: allergies, current medications, past family history, past medical history, past social history, past surgical history and problem list.    OBJECTIVE:    Review of Systems   Musculoskeletal: Positive for back pain.   Neurological: Positive for tremors.   All other systems reviewed and are negative.         Physical Exam    Neurologic Exam  Mental Status   Oriented to person, place, and time.   Attention: normal.   Speech: speech is normal   Level of consciousness: alert     Cranial Nerves   Cranial nerves II through XII intact.      Motor Exam   Muscle bulk: normal  Overall muscle tone: normal  Right arm pronator drift: absent  Left arm pronator drift: absent     Strength   Strength 5/5 throughout.      Sensory Exam   Light touch normal.   Pinprick normal.      Gait, Coordination, and Reflexes      Gait  Gait: (Unsteady shuffling)Some mild resting tremor in the right upper extremity significant right upper extremity intention and action tremor.       Independent Review of Radiographic Studies:   MRI of the brain with and without contrast done with DBS protocol as satisfactory    ASSESSMENT/PLAN:  Mr. Chandra has a severely disabling right upper extremity essential tremor.  He has been  refractory to medical management up to this point.  I think would be an excellent candidate for DBS to better control his essential tremor.  He only needs a unilateral lead placed.  We discussed the 3 step protocol.  We extensively discussed the expectations he should have for this surgery.  We also discussed risks and benefits which included but were not limited to infection, bleeding, paralysis, spinal fluid leak, bowel bladder incontinence, stroke, coma, and death.  He acknowledged understanding of this.  His questions concerns were addressed.  We will get him prepped and scheduled for fiducial placement and lead placement then battery placement.      1. Tremor    2. BMI 38.0-38.9,adult    3. Non-tobacco user            No Follow-up on file.      Jorge Luis Amos MD

## 2018-12-07 ENCOUNTER — PREP FOR SURGERY (OUTPATIENT)
Dept: OTHER | Facility: HOSPITAL | Age: 78
End: 2018-12-07

## 2018-12-07 DIAGNOSIS — G25.0 ESSENTIAL TREMOR: Primary | ICD-10-CM

## 2018-12-07 RX ORDER — BUPIVACAINE HCL/0.9 % NACL/PF 0.1 %
2 PLASTIC BAG, INJECTION (ML) EPIDURAL ONCE
Status: CANCELLED | OUTPATIENT
Start: 2018-12-07 | End: 2018-12-07

## 2018-12-07 NOTE — H&P
Chief Complaint   Patient presents with   • Tremors       patient is here to discuss DBS placement; patient had MRI @ [facility] on 11/19/18.         HPI  79 yo male s/p ulnar nerve decompression April 2018.  He is done very well with that.  His right hand pain numbness and tingling is all markedly improved.  He feels his strength is better.  He still continues to have the right upper extremity essential tremor which is been refractory to medical management.  We sent him for a planning MRI he is here to discuss the results.     The following portions of the patient's history were reviewed and updated as appropriate: allergies, current medications, past family history, past medical history, past social history, past surgical history and problem list.     OBJECTIVE:     Review of Systems   Musculoskeletal: Positive for back pain.   Neurological: Positive for tremors.   All other systems reviewed and are negative.           Physical Exam     Neurologic Exam  Mental Status   Oriented to person, place, and time.   Attention: normal.   Speech: speech is normal   Level of consciousness: alert     Cranial Nerves   Cranial nerves II through XII intact.      Motor Exam   Muscle bulk: normal  Overall muscle tone: normal  Right arm pronator drift: absent  Left arm pronator drift: absent     Strength   Strength 5/5 throughout.      Sensory Exam   Light touch normal.   Pinprick normal.      Gait, Coordination, and Reflexes      Gait  Gait: (Unsteady shuffling)Some mild resting tremor in the right upper extremity significant right upper extremity intention and action tremor.         Independent Review of Radiographic Studies:   MRI of the brain with and without contrast done with DBS protocol as satisfactory     ASSESSMENT/PLAN:  Mr. Chandra has a severely disabling right upper extremity essential tremor.  He has been refractory to medical management up to this point.  I think would be an excellent candidate for DBS to better control his  essential tremor.  He only needs a unilateral lead placed.  We discussed the 3 step protocol.  We extensively discussed the expectations he should have for this surgery.  We also discussed risks and benefits which included but were not limited to infection, bleeding, paralysis, spinal fluid leak, bowel bladder incontinence, stroke, coma, and death.  He acknowledged understanding of this.  His questions concerns were addressed.  We will get him prepped and scheduled for fiducial placement and lead placement then battery placement.        1. Tremor    2. BMI 38.0-38.9,adult    3. Non-tobacco user                No Follow-up on file.        Jorge Luis Amos MD

## 2018-12-07 NOTE — H&P
Chief Complaint   Patient presents with   • Tremors       patient is here to discuss DBS placement; patient had MRI @ [facility] on 11/19/18.         HPI  77 yo male s/p ulnar nerve decompression April 2018.  He is done very well with that.  His right hand pain numbness and tingling is all markedly improved.  He feels his strength is better.  He still continues to have the right upper extremity essential tremor which is been refractory to medical management.  We sent him for a planning MRI he is here to discuss the results.     The following portions of the patient's history were reviewed and updated as appropriate: allergies, current medications, past family history, past medical history, past social history, past surgical history and problem list.     OBJECTIVE:     Review of Systems   Musculoskeletal: Positive for back pain.   Neurological: Positive for tremors.   All other systems reviewed and are negative.           Physical Exam     Neurologic Exam  Mental Status   Oriented to person, place, and time.   Attention: normal.   Speech: speech is normal   Level of consciousness: alert     Cranial Nerves   Cranial nerves II through XII intact.      Motor Exam   Muscle bulk: normal  Overall muscle tone: normal  Right arm pronator drift: absent  Left arm pronator drift: absent     Strength   Strength 5/5 throughout.      Sensory Exam   Light touch normal.   Pinprick normal.      Gait, Coordination, and Reflexes      Gait  Gait: (Unsteady shuffling)Some mild resting tremor in the right upper extremity significant right upper extremity intention and action tremor.         Independent Review of Radiographic Studies:   MRI of the brain with and without contrast done with DBS protocol as satisfactory     ASSESSMENT/PLAN:  Mr. Chandra has a severely disabling right upper extremity essential tremor.  He has been refractory to medical management up to this point.  I think would be an excellent candidate for DBS to better control his  essential tremor.  He only needs a unilateral lead placed.  We discussed the 3 step protocol.  We extensively discussed the expectations he should have for this surgery.  We also discussed risks and benefits which included but were not limited to infection, bleeding, paralysis, spinal fluid leak, bowel bladder incontinence, stroke, coma, and death.  He acknowledged understanding of this.  His questions concerns were addressed.  We will get him prepped and scheduled for fiducial placement and lead placement then battery placement.        1. Tremor    2. BMI 38.0-38.9,adult    3. Non-tobacco user                No Follow-up on file.        Jorge Luis Amos MD

## 2018-12-07 NOTE — H&P
SUBJECTIVE:  Patient ID: Ross Chandra is a 78 y.o. male is here today for follow-up.     Chief Complaint:essential tremor       Chief Complaint   Patient presents with   • Tremors       patient is here to discuss DBS placement; patient had MRI @ [facility] on 11/19/18.         HPI  77 yo male s/p ulnar nerve decompression April 2018.  He is done very well with that.  His right hand pain numbness and tingling is all markedly improved.  He feels his strength is better.  He still continues to have the right upper extremity essential tremor which is been refractory to medical management.  We sent him for a planning MRI he is here to discuss the results.     The following portions of the patient's history were reviewed and updated as appropriate: allergies, current medications, past family history, past medical history, past social history, past surgical history and problem list.     OBJECTIVE:     Review of Systems   Musculoskeletal: Positive for back pain.   Neurological: Positive for tremors.   All other systems reviewed and are negative.           Physical Exam     Neurologic Exam  Mental Status   Oriented to person, place, and time.   Attention: normal.   Speech: speech is normal   Level of consciousness: alert     Cranial Nerves   Cranial nerves II through XII intact.      Motor Exam   Muscle bulk: normal  Overall muscle tone: normal  Right arm pronator drift: absent  Left arm pronator drift: absent     Strength   Strength 5/5 throughout.      Sensory Exam   Light touch normal.   Pinprick normal.      Gait, Coordination, and Reflexes      Gait  Gait: (Unsteady shuffling)Some mild resting tremor in the right upper extremity significant right upper extremity intention and action tremor.         Independent Review of Radiographic Studies:   MRI of the brain with and without contrast done with DBS protocol as satisfactory     ASSESSMENT/PLAN:  Mr. Chandra has a severely disabling right upper extremity essential tremor.   He has been refractory to medical management up to this point.  I think would be an excellent candidate for DBS to better control his essential tremor.  He only needs a unilateral lead placed.  We discussed the 3 step protocol.  We extensively discussed the expectations he should have for this surgery.  We also discussed risks and benefits which included but were not limited to infection, bleeding, paralysis, spinal fluid leak, bowel bladder incontinence, stroke, coma, and death.  He acknowledged understanding of this.  His questions concerns were addressed.  We will get him prepped and scheduled for fiducial placement and lead placement then battery placement.        1. Tremor    2. BMI 38.0-38.9,adult    3. Non-tobacco user                No Follow-up on file.        Jorge Luis Amos MD

## 2018-12-10 PROBLEM — G25.0 ESSENTIAL TREMOR: Status: ACTIVE | Noted: 2018-12-10

## 2019-01-15 RX ORDER — DORZOLAMIDE HYDROCHLORIDE AND TIMOLOL MALEATE 20; 5 MG/ML; MG/ML
1 SOLUTION/ DROPS OPHTHALMIC 2 TIMES DAILY
COMMUNITY

## 2019-01-16 ENCOUNTER — HOSPITAL ENCOUNTER (OUTPATIENT)
Dept: CT IMAGING | Facility: HOSPITAL | Age: 79
Discharge: HOME | End: 2019-01-16
Attending: NEUROLOGICAL SURGERY

## 2019-01-16 ENCOUNTER — ANESTHESIA EVENT (OUTPATIENT)
Dept: PREOP | Facility: HOSPITAL | Age: 79
End: 2019-01-16

## 2019-01-16 ENCOUNTER — HOSPITAL ENCOUNTER (OUTPATIENT)
Dept: PREOP | Facility: HOSPITAL | Age: 79
Setting detail: HOSPITAL OUTPATIENT SURGERY
Discharge: HOME | End: 2019-01-16
Admitting: RADIOLOGY

## 2019-01-16 ENCOUNTER — ANESTHESIA (OUTPATIENT)
Dept: PREOP | Facility: HOSPITAL | Age: 79
End: 2019-01-16

## 2019-01-16 ENCOUNTER — APPOINTMENT (OUTPATIENT)
Dept: PREADMISSION TESTING | Facility: HOSPITAL | Age: 79
End: 2019-01-16

## 2019-01-16 VITALS
HEART RATE: 68 BPM | DIASTOLIC BLOOD PRESSURE: 78 MMHG | WEIGHT: 298.06 LBS | HEIGHT: 68 IN | OXYGEN SATURATION: 97 % | TEMPERATURE: 96.9 F | SYSTOLIC BLOOD PRESSURE: 122 MMHG | BODY MASS INDEX: 45.17 KG/M2 | RESPIRATION RATE: 18 BRPM

## 2019-01-16 DIAGNOSIS — G25.0 ESSENTIAL TREMOR: ICD-10-CM

## 2019-01-16 DIAGNOSIS — R25.1 TREMOR: ICD-10-CM

## 2019-01-16 LAB
ABO GROUP BLD: NORMAL
ALBUMIN SERPL-MCNC: 4.3 G/DL (ref 3.5–5)
ALBUMIN/GLOB SERPL: 1.2 G/DL (ref 1.1–2.5)
ALP SERPL-CCNC: 56 U/L (ref 24–120)
ALT SERPL W P-5'-P-CCNC: 22 U/L (ref 0–54)
ANION GAP SERPL CALCULATED.3IONS-SCNC: 10 MMOL/L (ref 4–13)
AST SERPL-CCNC: 27 U/L (ref 7–45)
BILIRUB SERPL-MCNC: 0.7 MG/DL (ref 0.1–1)
BILIRUB UR QL STRIP: NEGATIVE
BLD GP AB SCN SERPL QL: NEGATIVE
BUN BLD-MCNC: 20 MG/DL (ref 5–21)
BUN/CREAT SERPL: 13.4 (ref 7–25)
CALCIUM SPEC-SCNC: 8.5 MG/DL (ref 8.4–10.4)
CHLORIDE SERPL-SCNC: 100 MMOL/L (ref 98–110)
CLARITY UR: CLEAR
CO2 SERPL-SCNC: 31 MMOL/L (ref 24–31)
COLOR UR: YELLOW
CREAT BLD-MCNC: 1.49 MG/DL (ref 0.5–1.4)
DEPRECATED RDW RBC AUTO: 44.6 FL (ref 40–54)
ERYTHROCYTE [DISTWIDTH] IN BLOOD BY AUTOMATED COUNT: 13.2 % (ref 12–15)
GFR SERPL CREATININE-BSD FRML MDRD: 46 ML/MIN/1.73
GLOBULIN UR ELPH-MCNC: 3.6 GM/DL
GLUCOSE BLD-MCNC: 154 MG/DL (ref 70–100)
GLUCOSE BLDC GLUCOMTR-MCNC: 136 MG/DL (ref 70–130)
GLUCOSE BLDC GLUCOMTR-MCNC: 140 MG/DL (ref 70–130)
GLUCOSE UR STRIP-MCNC: NEGATIVE MG/DL
HCT VFR BLD AUTO: 42.1 % (ref 40–52)
HGB BLD-MCNC: 13.6 G/DL (ref 14–18)
HGB UR QL STRIP.AUTO: NEGATIVE
KETONES UR QL STRIP: NEGATIVE
LEUKOCYTE ESTERASE UR QL STRIP.AUTO: NEGATIVE
MCH RBC QN AUTO: 29.6 PG (ref 28–32)
MCHC RBC AUTO-ENTMCNC: 32.3 G/DL (ref 33–36)
MCV RBC AUTO: 91.7 FL (ref 82–95)
NITRITE UR QL STRIP: NEGATIVE
PH UR STRIP.AUTO: 6 [PH] (ref 5–8)
PLATELET # BLD AUTO: 225 10*3/MM3 (ref 130–400)
PMV BLD AUTO: 10.6 FL (ref 6–12)
POTASSIUM BLD-SCNC: 3.9 MMOL/L (ref 3.5–5.3)
PROT SERPL-MCNC: 7.9 G/DL (ref 6.3–8.7)
PROT UR QL STRIP: NEGATIVE
RBC # BLD AUTO: 4.59 10*6/MM3 (ref 4.8–5.9)
RH BLD: POSITIVE
SODIUM BLD-SCNC: 141 MMOL/L (ref 135–145)
SP GR UR STRIP: 1.03 (ref 1–1.03)
T&S EXPIRATION DATE: NORMAL
UROBILINOGEN UR QL STRIP: NORMAL
WBC NRBC COR # BLD: 7.29 10*3/MM3 (ref 4.8–10.8)

## 2019-01-16 PROCEDURE — 86900 BLOOD TYPING SEROLOGIC ABO: CPT | Performed by: NEUROLOGICAL SURGERY

## 2019-01-16 PROCEDURE — 70450 CT HEAD/BRAIN W/O DYE: CPT

## 2019-01-16 PROCEDURE — 82962 GLUCOSE BLOOD TEST: CPT

## 2019-01-16 PROCEDURE — 25010000002 PROPOFOL 10 MG/ML EMULSION: Performed by: NURSE ANESTHETIST, CERTIFIED REGISTERED

## 2019-01-16 PROCEDURE — 93005 ELECTROCARDIOGRAM TRACING: CPT | Performed by: NEUROLOGICAL SURGERY

## 2019-01-16 PROCEDURE — 25010000002 VANCOMYCIN 1 G RECONSTITUTED SOLUTION: Performed by: NEUROLOGICAL SURGERY

## 2019-01-16 PROCEDURE — 93010 ELECTROCARDIOGRAM REPORT: CPT | Performed by: INTERNAL MEDICINE

## 2019-01-16 PROCEDURE — 86901 BLOOD TYPING SEROLOGIC RH(D): CPT | Performed by: NEUROLOGICAL SURGERY

## 2019-01-16 PROCEDURE — 80053 COMPREHEN METABOLIC PANEL: CPT | Performed by: NEUROLOGICAL SURGERY

## 2019-01-16 PROCEDURE — 81003 URINALYSIS AUTO W/O SCOPE: CPT | Performed by: NEUROLOGICAL SURGERY

## 2019-01-16 PROCEDURE — 86850 RBC ANTIBODY SCREEN: CPT | Performed by: NEUROLOGICAL SURGERY

## 2019-01-16 PROCEDURE — 85027 COMPLETE CBC AUTOMATED: CPT | Performed by: NEUROLOGICAL SURGERY

## 2019-01-16 RX ORDER — IBUPROFEN 600 MG/1
600 TABLET ORAL ONCE AS NEEDED
Status: DISCONTINUED | OUTPATIENT
Start: 2019-01-16 | End: 2019-01-17 | Stop reason: HOSPADM

## 2019-01-16 RX ORDER — MEPERIDINE HYDROCHLORIDE 25 MG/ML
12.5 INJECTION INTRAMUSCULAR; INTRAVENOUS; SUBCUTANEOUS
Status: DISCONTINUED | OUTPATIENT
Start: 2019-01-16 | End: 2019-01-17 | Stop reason: HOSPADM

## 2019-01-16 RX ORDER — BUPIVACAINE HCL/0.9 % NACL/PF 0.1 %
2 PLASTIC BAG, INJECTION (ML) EPIDURAL ONCE
Status: DISCONTINUED | OUTPATIENT
Start: 2019-01-16 | End: 2019-01-16

## 2019-01-16 RX ORDER — METOCLOPRAMIDE HYDROCHLORIDE 5 MG/ML
5 INJECTION INTRAMUSCULAR; INTRAVENOUS
Status: DISCONTINUED | OUTPATIENT
Start: 2019-01-16 | End: 2019-01-17 | Stop reason: HOSPADM

## 2019-01-16 RX ORDER — SODIUM CHLORIDE 0.9 % (FLUSH) 0.9 %
3 SYRINGE (ML) INJECTION EVERY 12 HOURS SCHEDULED
Status: DISCONTINUED | OUTPATIENT
Start: 2019-01-16 | End: 2019-01-17 | Stop reason: HOSPADM

## 2019-01-16 RX ORDER — NALOXONE HCL 0.4 MG/ML
0.4 VIAL (ML) INJECTION AS NEEDED
Status: DISCONTINUED | OUTPATIENT
Start: 2019-01-16 | End: 2019-01-17 | Stop reason: HOSPADM

## 2019-01-16 RX ORDER — SODIUM CHLORIDE 0.9 % (FLUSH) 0.9 %
1-10 SYRINGE (ML) INJECTION AS NEEDED
Status: DISCONTINUED | OUTPATIENT
Start: 2019-01-16 | End: 2019-01-17 | Stop reason: HOSPADM

## 2019-01-16 RX ORDER — FENTANYL CITRATE 50 UG/ML
25 INJECTION, SOLUTION INTRAMUSCULAR; INTRAVENOUS AS NEEDED
Status: DISCONTINUED | OUTPATIENT
Start: 2019-01-16 | End: 2019-01-17 | Stop reason: HOSPADM

## 2019-01-16 RX ORDER — LABETALOL HYDROCHLORIDE 5 MG/ML
5 INJECTION, SOLUTION INTRAVENOUS
Status: DISCONTINUED | OUTPATIENT
Start: 2019-01-16 | End: 2019-01-17 | Stop reason: HOSPADM

## 2019-01-16 RX ORDER — ONDANSETRON 2 MG/ML
4 INJECTION INTRAMUSCULAR; INTRAVENOUS ONCE AS NEEDED
Status: DISCONTINUED | OUTPATIENT
Start: 2019-01-16 | End: 2019-01-17 | Stop reason: HOSPADM

## 2019-01-16 RX ORDER — SODIUM CHLORIDE, SODIUM LACTATE, POTASSIUM CHLORIDE, CALCIUM CHLORIDE 600; 310; 30; 20 MG/100ML; MG/100ML; MG/100ML; MG/100ML
1000 INJECTION, SOLUTION INTRAVENOUS CONTINUOUS
Status: DISCONTINUED | OUTPATIENT
Start: 2019-01-16 | End: 2019-01-17 | Stop reason: HOSPADM

## 2019-01-16 RX ORDER — SODIUM CHLORIDE 0.9 % (FLUSH) 0.9 %
3 SYRINGE (ML) INJECTION AS NEEDED
Status: DISCONTINUED | OUTPATIENT
Start: 2019-01-16 | End: 2019-01-17 | Stop reason: HOSPADM

## 2019-01-16 RX ORDER — PROPOFOL 10 MG/ML
VIAL (ML) INTRAVENOUS AS NEEDED
Status: DISCONTINUED | OUTPATIENT
Start: 2019-01-16 | End: 2019-01-16 | Stop reason: SURG

## 2019-01-16 RX ORDER — IPRATROPIUM BROMIDE AND ALBUTEROL SULFATE 2.5; .5 MG/3ML; MG/3ML
3 SOLUTION RESPIRATORY (INHALATION) ONCE AS NEEDED
Status: DISCONTINUED | OUTPATIENT
Start: 2019-01-16 | End: 2019-01-17 | Stop reason: HOSPADM

## 2019-01-16 RX ORDER — SODIUM CHLORIDE, SODIUM LACTATE, POTASSIUM CHLORIDE, CALCIUM CHLORIDE 600; 310; 30; 20 MG/100ML; MG/100ML; MG/100ML; MG/100ML
100 INJECTION, SOLUTION INTRAVENOUS CONTINUOUS
Status: DISCONTINUED | OUTPATIENT
Start: 2019-01-16 | End: 2019-01-17 | Stop reason: HOSPADM

## 2019-01-16 RX ADMIN — PROPOFOL 250 MG: 10 INJECTION, EMULSION INTRAVENOUS at 07:43

## 2019-01-16 RX ADMIN — SODIUM CHLORIDE, POTASSIUM CHLORIDE, SODIUM LACTATE AND CALCIUM CHLORIDE 1000 ML: 600; 310; 30; 20 INJECTION, SOLUTION INTRAVENOUS at 07:13

## 2019-01-16 RX ADMIN — LIDOCAINE HYDROCHLORIDE 0.5 ML: 10 INJECTION, SOLUTION EPIDURAL; INFILTRATION; INTRACAUDAL; PERINEURAL at 07:13

## 2019-01-16 RX ADMIN — VANCOMYCIN HYDROCHLORIDE 1 G: 1 INJECTION, POWDER, LYOPHILIZED, FOR SOLUTION INTRAVENOUS at 07:45

## 2019-01-16 NOTE — DISCHARGE INSTRUCTIONS
DAY OF SURGERY INSTRUCTIONS        YOUR SURGEON: ***DR. STEIN    PROCEDURE: ***BRAIN STIMULATOR STAGE 2 LEAD PLACEMENT    DATE OF SURGERY: ***1/23/2019    ARRIVAL TIME: AS DIRECTED BY OFFICE    YOU MAY TAKE THE FOLLOWING MEDICATION(S) THE MORNING OF SURGERY WITH A SIP OF WATER: *** NONE (CHECK WITH DR. STEIN'S OFFICE REGARDING WHEN AND IF TO STOP ASPIRIN AND BLOOD THINNERS)              MANAGING PAIN AFTER SURGERY    We know you are probably wondering what your pain will be like after surgery.  Following surgery it is unrealistic to expect you will not have pain.   Pain is how our bodies let us know that something is wrong or cautions us to be careful.  That said, our goal is to make your pain tolerable.    Methods we may use to treat your pain include (oral or IV medications, PCAs, epidurals, nerve blocks, etc.)   While some procedures require IV pain medications for a short time after surgery, transitioning to pain medications by mouth allows for better management of pain.   Your nurse will encourage you to take oral pain medications whenever possible.  IV medications work almost immediately, but only last a short while.  Taking medications by mouth allows for a more constant level of medication in your blood stream for a longer period of time.      Once your pain is out of control it is harder to get back under control.  It is important you are aware when your next dose of pain medication is due.  If you are admitted, your nurse may write the time of your next dose on the white board in your room to help you remember.      We are interested in your pain and encourage you to inform us about aggravating factors during your visit.   Many times a simple repositioning every few hours can make a big difference.    If your physician says it is okay, do not let your pain prevent you from getting out of bed. Be sure to call your nurse for assistance prior to getting up so you do not fall.      Before surgery, please  decide your tolerable pain goal.  These faces help describe the pain ratings we use on a 0-10 scale.   Be prepared to tell us your goal and whether or not you take pain or anxiety medications at home.            BEFORE YOU COME TO THE HOSPITAL  (Pre-op instructions)  • Do not eat, drink, smoke or chew gum after midnight the night before surgery.  This also includes no mints.  • Morning of surgery take only the medicines you have been instructed with a sip of water unless otherwise instructed  by your physician.  • Do not shave, wear makeup or dark nail polish.  • Remove all jewelry including rings.  • Leave anything you consider valuable at home.  • Leave your suitcase in the car until after your surgery.  • Bring the following with you if applicable:  o Picture ID and insurance, Medicare or Medicaid cards  o Co-pay/deductible required by insurance (cash, check, credit card)  o Copy of advance directive, living will or power-of- documents if not brought to PAT  o CPAP or BIPAP mask and tubing  o Relaxation aids (MP3 player, book, magazine)  • On the day of surgery check in at registration located at the main entrance of the hospital.       Outpatient Surgery Guidelines, Adult  Outpatient procedures are those for which the person having the procedure is allowed to go home the same day as the procedure. Various procedures are done on an outpatient basis. You should follow some general guidelines if you will be having an outpatient procedure.  LET YOUR HEALTH CARE PROVIDER KNOW ABOUT:  · Any allergies you have.  · All medicines you are taking, including vitamins, herbs, eye drops, creams, and over-the-counter medicines.  · Previous problems you or members of your family have had with the use of anesthetics.  · Any blood disorders you have.  · Previous surgeries you have had.  · Medical conditions you have.  RISKS AND COMPLICATIONS  Your health care provider will discuss possible risks and complications with you  before surgery. Common risks and complications include:    · Problems due to the use of anesthetics.  · Blood loss and replacement (does not apply to minor surgical procedures).  · Temporary increase in pain due to surgery.  · Uncorrected pain or problems that the surgery was meant to correct.  · Infection.  · New damage.  BEFORE THE PROCEDURE  · Ask your health care provider about changing or stopping your regular medicines. You may need to stop taking certain medicines in the days or weeks before the procedure.  · Stop smoking at least 2 weeks before surgery. This lowers your risk for complications during and after surgery. Ask your health care provider for help with this if needed.  · Eat your usual meals and a light supper the day before surgery. Continue fluid intake. Do not drink alcohol.  · Do not eat or drink after midnight the night before your surgery.   · Arrange for someone to take you home and to stay with you for 24 hours after the procedure. Medicine given for your procedure may affect your ability to drive or to care for yourself.  · Call your health care provider's office if you develop an illness or problem that may prevent you from safely having your procedure.  AFTER THE PROCEDURE  After surgery, you will be taken to a recovery area, where your progress will be monitored. If there are no complications, you will be allowed to go home when you are awake, stable, and taking fluids well. You may have numbness around the surgical site. Healing will take some time. You will have tenderness at the surgical site and may have some swelling and bruising. You may also have some nausea.  HOME CARE INSTRUCTIONS  · Do not drive for 24 hours, or as directed by your health care provider. Do not drive while taking prescription pain medicines.  · Do not drink alcohol for 24 hours.  · Do not make important decisions or sign legal documents for 24 hours.  · You may resume a normal diet and activities as  directed.  · Do not lift anything heavier than 10 pounds (4.5 kg) or play contact sports until your health care provider says it is okay.  · Change your bandages (dressings) as directed.  · Only take over-the-counter or prescription medicines as directed by your health care provider.  · Follow up with your health care provider as directed.  SEEK MEDICAL CARE IF:  · You have increased bleeding (more than a small spot) from the surgical site.  · You have redness, swelling, or increasing pain in the wound.  · You see pus coming from the wound.  · You have a fever.  · You notice a bad smell coming from the wound or dressing.  · You feel lightheaded or faint.  · You develop a rash.  · You have trouble breathing.  · You develop allergies.  MAKE SURE YOU:  · Understand these instructions.  · Will watch your condition.  · Will get help right away if you are not doing well or get worse.     This information is not intended to replace advice given to you by your health care provider. Make sure you discuss any questions you have with your health care provider.     Document Released: 09/12/2002 Document Revised: 05/03/2016 Document Reviewed: 05/22/2014  Ruth Kunstadter â€“ [employer] Interactive Patient Education ©2016 Ruth Kunstadter â€“ [employer]       Fall Prevention in Hospitals, Adult  As a hospital patient, your condition and the treatments you receive can increase your risk for falls. Some additional risk factors for falls in a hospital include:  · Being in an unfamiliar environment.  · Being on bed rest.  · Your surgery.  · Taking certain medicines.  · Your tubing requirements, such as intravenous (IV) therapy or catheters.  It is important that you learn how to decrease fall risks while at the hospital. Below are important tips that can help prevent falls.  SAFETY TIPS FOR PREVENTING FALLS  Talk about your risk of falling.  · Ask your health care provider why you are at risk for falling. Is it your medicine, illness, tubing placement, or something  else?  · Make a plan with your health care provider to keep you safe from falls.  · Ask your health care provider or pharmacist about side effects of your medicines. Some medicines can make you dizzy or affect your coordination.  Ask for help.  · Ask for help before getting out of bed. You may need to press your call button.  · Ask for assistance in getting safely to the toilet.  · Ask for a walker or cane to be put at your bedside. Ask that most of the side rails on your bed be placed up before your health care provider leaves the room.  · Ask family or friends to sit with you.  · Ask for things that are out of your reach, such as your glasses, hearing aids, telephone, bedside table, or call button.  Follow these tips to avoid falling:  · Stay lying or seated, rather than standing, while waiting for help.  · Wear rubber-soled slippers or shoes whenever you walk in the hospital.  · Avoid quick, sudden movements.  ¨ Change positions slowly.  ¨ Sit on the side of your bed before standing.  ¨ Stand up slowly and wait before you start to walk.  · Let your health care provider know if there is a spill on the floor.  · Pay careful attention to the medical equipment, electrical cords, and tubes around you.  · When you need help, use your call button by your bed or in the bathroom. Wait for one of your health care providers to help you.  · If you feel dizzy or unsure of your footing, return to bed and wait for assistance.  · Avoid being distracted by the TV, telephone, or another person in your room.  · Do not lean or support yourself on rolling objects, such as IV poles or bedside tables.     This information is not intended to replace advice given to you by your health care provider. Make sure you discuss any questions you have with your health care provider.     Document Released: 12/15/2001 Document Revised: 01/08/2016 Document Reviewed: 08/25/2013  Elsevier Interactive Patient Education ©2016 [employer]       Surgical Site Infections FAQs  What is a Surgical Site Infection (SSI)?  A surgical site infection is an infection that occurs after surgery in the part of the body where the surgery took place. Most patients who have surgery do not develop an infection. However, infections develop in about 1 to 3 out of every 100 patients who have surgery.  Some of the common symptoms of a surgical site infection are:  · Redness and pain around the area where you had surgery  · Drainage of cloudy fluid from your surgical wound  · Fever  Can SSIs be treated?  Yes. Most surgical site infections can be treated with antibiotics. The antibiotic given to you depends on the bacteria (germs) causing the infection. Sometimes patients with SSIs also need another surgery to treat the infection.  What are some of the things that hospitals are doing to prevent SSIs?  To prevent SSIs, doctors, nurses, and other healthcare providers:  · Clean their hands and arms up to their elbows with an antiseptic agent just before the surgery.  · Clean their hands with soap and water or an alcohol-based hand rub before and after caring for each patient.  · May remove some of your hair immediately before your surgery using electric clippers if the hair is in the same area where the procedure will occur. They should not shave you with a razor.  · Wear special hair covers, masks, gowns, and gloves during surgery to keep the surgery area clean.  · Give you antibiotics before your surgery starts. In most cases, you should get antibiotics within 60 minutes before the surgery starts and the antibiotics should be stopped within 24 hours after surgery.  · Clean the skin at the site of your surgery with a special soap that kills germs.  What can I do to help prevent SSIs?  Before your surgery:  · Tell your doctor about other medical problems you may have. Health problems such as allergies, diabetes, and obesity could affect your surgery and your  treatment.  · Quit smoking. Patients who smoke get more infections. Talk to your doctor about how you can quit before your surgery.  · Do not shave near where you will have surgery. Shaving with a razor can irritate your skin and make it easier to develop an infection.  At the time of your surgery:  · Speak up if someone tries to shave you with a razor before surgery. Ask why you need to be shaved and talk with your surgeon if you have any concerns.  · Ask if you will get antibiotics before surgery.  After your surgery:  · Make sure that your healthcare providers clean their hands before examining you, either with soap and water or an alcohol-based hand rub.  · If you do not see your providers clean their hands, please ask them to do so.  · Family and friends who visit you should not touch the surgical wound or dressings.  · Family and friends should clean their hands with soap and water or an alcohol-based hand rub before and after visiting you. If you do not see them clean their hands, ask them to clean their hands.  What do I need to do when I go home from the hospital?  · Before you go home, your doctor or nurse should explain everything you need to know about taking care of your wound. Make sure you understand how to care for your wound before you leave the hospital.  · Always clean your hands before and after caring for your wound.  · Before you go home, make sure you know who to contact if you have questions or problems after you get home.  · If you have any symptoms of an infection, such as redness and pain at the surgery site, drainage, or fever, call your doctor immediately.  If you have additional questions, please ask your doctor or nurse.  Developed and co-sponsored by The Society for Healthcare Epidemiology of [place] (SHEA); Infectious Diseases Society of [place] (IDSA); American Hospital Association; Association for Professionals in Infection Control and Epidemiology (APIC); Centers for Disease  Control and Prevention (CDC); and The Joint Commission.     This information is not intended to replace advice given to you by your health care provider. Make sure you discuss any questions you have with your health care provider.     Document Released: 12/23/2014 Document Revised: 01/08/2016 Document Reviewed: 03/02/2016  Elsevier Interactive Patient Education ©2016 [employer]

## 2019-01-16 NOTE — ANESTHESIA PREPROCEDURE EVALUATION
Anesthesia Evaluation     Patient summary reviewed   no history of anesthetic complications:  NPO Solid Status: > 8 hours  NPO Liquid Status: > 8 hours           Airway   Mallampati: III  TM distance: >3 FB  Neck ROM: full  Dental          Pulmonary - normal exam    breath sounds clear to auscultation  (-) asthma, recent URI, sleep apnea, not a smoker  Cardiovascular - normal exam  Exercise tolerance: poor (<4 METS) (Denies chest pain with exertion, but ambulation is difficult)    ECG reviewed  Patient on routine beta blocker  Rhythm: regular  Rate: normal    (+) hypertension, hyperlipidemia,   (-) pacemaker, past MI, angina, cardiac stents, CABG      Neuro/Psych  (+) CVA (2013, residual left leg weakness) residual symptoms, tremors, psychiatric history Depression,     (-) seizures  GI/Hepatic/Renal/Endo    (+) morbid obesity, GERD,  renal disease CRI, diabetes mellitus, hypothyroidism,   (-) liver disease, hyperthyroidism    Musculoskeletal     Abdominal    Substance History      OB/GYN          Other                        Anesthesia Plan    ASA 3     general     intravenous induction   Anesthetic plan, all risks, benefits, and alternatives have been provided, discussed and informed consent has been obtained with: patient.

## 2019-01-23 ENCOUNTER — ANESTHESIA (OUTPATIENT)
Dept: PERIOP | Facility: HOSPITAL | Age: 79
End: 2019-01-23

## 2019-01-23 ENCOUNTER — APPOINTMENT (OUTPATIENT)
Dept: CT IMAGING | Facility: HOSPITAL | Age: 79
End: 2019-01-23

## 2019-01-23 ENCOUNTER — ANESTHESIA EVENT (OUTPATIENT)
Dept: PERIOP | Facility: HOSPITAL | Age: 79
End: 2019-01-23

## 2019-01-23 ENCOUNTER — HOSPITAL ENCOUNTER (INPATIENT)
Facility: HOSPITAL | Age: 79
LOS: 1 days | Discharge: HOME | End: 2019-01-24
Attending: NEUROLOGICAL SURGERY | Admitting: NEUROLOGICAL SURGERY

## 2019-01-23 DIAGNOSIS — G25.0 ESSENTIAL TREMOR: ICD-10-CM

## 2019-01-23 LAB
ABO GROUP BLD: NORMAL
BLD GP AB SCN SERPL QL: NEGATIVE
GLUCOSE BLDC GLUCOMTR-MCNC: 136 MG/DL (ref 70–130)
GLUCOSE BLDC GLUCOMTR-MCNC: 157 MG/DL (ref 70–130)
GLUCOSE BLDC GLUCOMTR-MCNC: 170 MG/DL (ref 70–130)
GLUCOSE BLDC GLUCOMTR-MCNC: 177 MG/DL (ref 70–130)
RH BLD: POSITIVE
T&S EXPIRATION DATE: NORMAL

## 2019-01-23 PROCEDURE — 25010000002 VANCOMYCIN 1 G RECONSTITUTED SOLUTION 1 EACH VIAL: Performed by: NURSE PRACTITIONER

## 2019-01-23 PROCEDURE — 82962 GLUCOSE BLOOD TEST: CPT

## 2019-01-23 PROCEDURE — 94799 UNLISTED PULMONARY SVC/PX: CPT

## 2019-01-23 PROCEDURE — 00H03MZ INSERTION OF NEUROSTIMULATOR LEAD INTO BRAIN, PERCUTANEOUS APPROACH: ICD-10-PCS | Performed by: NEUROLOGICAL SURGERY

## 2019-01-23 PROCEDURE — 86901 BLOOD TYPING SEROLOGIC RH(D): CPT | Performed by: NEUROLOGICAL SURGERY

## 2019-01-23 PROCEDURE — 25010000002 VANCOMYCIN 1 G RECONSTITUTED SOLUTION: Performed by: NEUROLOGICAL SURGERY

## 2019-01-23 PROCEDURE — 86850 RBC ANTIBODY SCREEN: CPT | Performed by: NEUROLOGICAL SURGERY

## 2019-01-23 PROCEDURE — 94760 N-INVAS EAR/PLS OXIMETRY 1: CPT

## 2019-01-23 PROCEDURE — 61867 IMPLANT NEUROELECTRODE: CPT | Performed by: NEUROLOGICAL SURGERY

## 2019-01-23 PROCEDURE — 86900 BLOOD TYPING SEROLOGIC ABO: CPT | Performed by: NEUROLOGICAL SURGERY

## 2019-01-23 PROCEDURE — 70450 CT HEAD/BRAIN W/O DYE: CPT

## 2019-01-23 PROCEDURE — 25010000002 DEXAMETHASONE PER 1 MG: Performed by: ANESTHESIOLOGY

## 2019-01-23 PROCEDURE — 25010000002 ONDANSETRON PER 1 MG: Performed by: ANESTHESIOLOGY

## 2019-01-23 PROCEDURE — 63710000001 INSULIN LISPRO (HUMAN) PER 5 UNITS: Performed by: NURSE PRACTITIONER

## 2019-01-23 PROCEDURE — 25010000002 LEVETIRACETAM IN NACL 0.82% 500 MG/100ML SOLUTION: Performed by: NURSE PRACTITIONER

## 2019-01-23 DEVICE — IMPLANTABLE DEVICE: Type: IMPLANTABLE DEVICE | Status: FUNCTIONAL

## 2019-01-23 DEVICE — DURASEAL® DURAL SEALANT SYSTEM 5ML 5 PACK
Type: IMPLANTABLE DEVICE | Status: FUNCTIONAL
Brand: DURASEAL®

## 2019-01-23 DEVICE — HEMO ABS GELFOAM SPNG PORCN SZ100: Type: IMPLANTABLE DEVICE | Status: FUNCTIONAL

## 2019-01-23 RX ORDER — BACITRACIN ZINC 500 [USP'U]/G
OINTMENT TOPICAL AS NEEDED
Status: DISCONTINUED | OUTPATIENT
Start: 2019-01-23 | End: 2019-01-23 | Stop reason: HOSPADM

## 2019-01-23 RX ORDER — FUROSEMIDE 20 MG/1
20 TABLET ORAL DAILY
Status: DISCONTINUED | OUTPATIENT
Start: 2019-01-23 | End: 2019-01-24 | Stop reason: HOSPADM

## 2019-01-23 RX ORDER — ESCITALOPRAM OXALATE 10 MG/1
10 TABLET ORAL DAILY
Status: DISCONTINUED | OUTPATIENT
Start: 2019-01-23 | End: 2019-01-24 | Stop reason: HOSPADM

## 2019-01-23 RX ORDER — DEXTROSE MONOHYDRATE 25 G/50ML
25 INJECTION, SOLUTION INTRAVENOUS
Status: DISCONTINUED | OUTPATIENT
Start: 2019-01-23 | End: 2019-01-24 | Stop reason: HOSPADM

## 2019-01-23 RX ORDER — FAMOTIDINE 10 MG/ML
20 INJECTION, SOLUTION INTRAVENOUS
Status: DISCONTINUED | OUTPATIENT
Start: 2019-01-23 | End: 2019-01-23 | Stop reason: HOSPADM

## 2019-01-23 RX ORDER — TRISODIUM CITRATE DIHYDRATE AND CITRIC ACID MONOHYDRATE 500; 334 MG/5ML; MG/5ML
30 SOLUTION ORAL ONCE
Status: COMPLETED | OUTPATIENT
Start: 2019-01-23 | End: 2019-01-23

## 2019-01-23 RX ORDER — DEXAMETHASONE SODIUM PHOSPHATE 4 MG/ML
4 INJECTION, SOLUTION INTRA-ARTICULAR; INTRALESIONAL; INTRAMUSCULAR; INTRAVENOUS; SOFT TISSUE ONCE AS NEEDED
Status: COMPLETED | OUTPATIENT
Start: 2019-01-23 | End: 2019-01-23

## 2019-01-23 RX ORDER — NALOXONE HCL 0.4 MG/ML
0.4 VIAL (ML) INJECTION AS NEEDED
Status: DISCONTINUED | OUTPATIENT
Start: 2019-01-23 | End: 2019-01-23 | Stop reason: HOSPADM

## 2019-01-23 RX ORDER — MEPERIDINE HYDROCHLORIDE 50 MG/ML
50 INJECTION INTRAMUSCULAR; INTRAVENOUS; SUBCUTANEOUS
Status: DISCONTINUED | OUTPATIENT
Start: 2019-01-23 | End: 2019-01-24 | Stop reason: HOSPADM

## 2019-01-23 RX ORDER — LISINOPRIL 5 MG/1
5 TABLET ORAL DAILY
Status: DISCONTINUED | OUTPATIENT
Start: 2019-01-23 | End: 2019-01-24 | Stop reason: HOSPADM

## 2019-01-23 RX ORDER — MAGNESIUM HYDROXIDE 1200 MG/15ML
LIQUID ORAL AS NEEDED
Status: DISCONTINUED | OUTPATIENT
Start: 2019-01-23 | End: 2019-01-23 | Stop reason: HOSPADM

## 2019-01-23 RX ORDER — BUPIVACAINE HCL/0.9 % NACL/PF 0.1 %
2 PLASTIC BAG, INJECTION (ML) EPIDURAL ONCE
Status: DISCONTINUED | OUTPATIENT
Start: 2019-01-23 | End: 2019-01-23

## 2019-01-23 RX ORDER — ONDANSETRON 2 MG/ML
4 INJECTION INTRAMUSCULAR; INTRAVENOUS ONCE AS NEEDED
Status: COMPLETED | OUTPATIENT
Start: 2019-01-23 | End: 2019-01-23

## 2019-01-23 RX ORDER — DEXMEDETOMIDINE HYDROCHLORIDE 100 UG/ML
INJECTION, SOLUTION INTRAVENOUS AS NEEDED
Status: DISCONTINUED | OUTPATIENT
Start: 2019-01-23 | End: 2019-01-23 | Stop reason: SURG

## 2019-01-23 RX ORDER — OXYCODONE AND ACETAMINOPHEN 7.5; 325 MG/1; MG/1
2 TABLET ORAL EVERY 4 HOURS PRN
Status: DISCONTINUED | OUTPATIENT
Start: 2019-01-23 | End: 2019-01-23 | Stop reason: HOSPADM

## 2019-01-23 RX ORDER — LEVETIRACETAM 5 MG/ML
500 INJECTION INTRAVASCULAR EVERY 12 HOURS
Status: DISCONTINUED | OUTPATIENT
Start: 2019-01-23 | End: 2019-01-24 | Stop reason: HOSPADM

## 2019-01-23 RX ORDER — IPRATROPIUM BROMIDE AND ALBUTEROL SULFATE 2.5; .5 MG/3ML; MG/3ML
3 SOLUTION RESPIRATORY (INHALATION) ONCE AS NEEDED
Status: DISCONTINUED | OUTPATIENT
Start: 2019-01-23 | End: 2019-01-23 | Stop reason: HOSPADM

## 2019-01-23 RX ORDER — POTASSIUM CHLORIDE 750 MG/1
20 CAPSULE, EXTENDED RELEASE ORAL DAILY
Status: DISCONTINUED | OUTPATIENT
Start: 2019-01-23 | End: 2019-01-24 | Stop reason: HOSPADM

## 2019-01-23 RX ORDER — SODIUM CHLORIDE 0.9 % (FLUSH) 0.9 %
1-10 SYRINGE (ML) INJECTION AS NEEDED
Status: DISCONTINUED | OUTPATIENT
Start: 2019-01-23 | End: 2019-01-23 | Stop reason: HOSPADM

## 2019-01-23 RX ORDER — OXYCODONE AND ACETAMINOPHEN 10; 325 MG/1; MG/1
1 TABLET ORAL ONCE AS NEEDED
Status: DISCONTINUED | OUTPATIENT
Start: 2019-01-23 | End: 2019-01-23 | Stop reason: HOSPADM

## 2019-01-23 RX ORDER — NICOTINE POLACRILEX 4 MG
15 LOZENGE BUCCAL
Status: DISCONTINUED | OUTPATIENT
Start: 2019-01-23 | End: 2019-01-24 | Stop reason: HOSPADM

## 2019-01-23 RX ORDER — LABETALOL HYDROCHLORIDE 5 MG/ML
5 INJECTION, SOLUTION INTRAVENOUS
Status: DISCONTINUED | OUTPATIENT
Start: 2019-01-23 | End: 2019-01-23 | Stop reason: HOSPADM

## 2019-01-23 RX ORDER — BUPIVACAINE HCL/0.9 % NACL/PF 0.1 %
2 PLASTIC BAG, INJECTION (ML) EPIDURAL ONCE
Status: DISCONTINUED | OUTPATIENT
Start: 2019-01-23 | End: 2019-01-23 | Stop reason: HOSPADM

## 2019-01-23 RX ORDER — SODIUM CHLORIDE 9 MG/ML
75 INJECTION, SOLUTION INTRAVENOUS CONTINUOUS
Status: DISCONTINUED | OUTPATIENT
Start: 2019-01-23 | End: 2019-01-24 | Stop reason: HOSPADM

## 2019-01-23 RX ORDER — LEVOTHYROXINE SODIUM 0.05 MG/1
50 TABLET ORAL
Status: DISCONTINUED | OUTPATIENT
Start: 2019-01-24 | End: 2019-01-24 | Stop reason: HOSPADM

## 2019-01-23 RX ORDER — LABETALOL HYDROCHLORIDE 5 MG/ML
10 INJECTION, SOLUTION INTRAVENOUS
Status: DISCONTINUED | OUTPATIENT
Start: 2019-01-23 | End: 2019-01-24 | Stop reason: HOSPADM

## 2019-01-23 RX ORDER — ONDANSETRON 2 MG/ML
4 INJECTION INTRAMUSCULAR; INTRAVENOUS ONCE AS NEEDED
Status: DISCONTINUED | OUTPATIENT
Start: 2019-01-23 | End: 2019-01-23 | Stop reason: HOSPADM

## 2019-01-23 RX ORDER — FENTANYL CITRATE 50 UG/ML
25 INJECTION, SOLUTION INTRAMUSCULAR; INTRAVENOUS AS NEEDED
Status: DISCONTINUED | OUTPATIENT
Start: 2019-01-23 | End: 2019-01-23 | Stop reason: HOSPADM

## 2019-01-23 RX ORDER — PANTOPRAZOLE SODIUM 40 MG/1
40 TABLET, DELAYED RELEASE ORAL DAILY
Status: DISCONTINUED | OUTPATIENT
Start: 2019-01-23 | End: 2019-01-24 | Stop reason: HOSPADM

## 2019-01-23 RX ORDER — SODIUM CHLORIDE, SODIUM LACTATE, POTASSIUM CHLORIDE, CALCIUM CHLORIDE 600; 310; 30; 20 MG/100ML; MG/100ML; MG/100ML; MG/100ML
1000 INJECTION, SOLUTION INTRAVENOUS CONTINUOUS
Status: DISCONTINUED | OUTPATIENT
Start: 2019-01-23 | End: 2019-01-23 | Stop reason: HOSPADM

## 2019-01-23 RX ORDER — SODIUM CHLORIDE 0.9 % (FLUSH) 0.9 %
3 SYRINGE (ML) INJECTION EVERY 12 HOURS SCHEDULED
Status: DISCONTINUED | OUTPATIENT
Start: 2019-01-23 | End: 2019-01-23 | Stop reason: HOSPADM

## 2019-01-23 RX ORDER — SODIUM CHLORIDE 9 MG/ML
INJECTION, SOLUTION INTRAVENOUS AS NEEDED
Status: DISCONTINUED | OUTPATIENT
Start: 2019-01-23 | End: 2019-01-23 | Stop reason: HOSPADM

## 2019-01-23 RX ORDER — METOCLOPRAMIDE HYDROCHLORIDE 5 MG/ML
5 INJECTION INTRAMUSCULAR; INTRAVENOUS
Status: DISCONTINUED | OUTPATIENT
Start: 2019-01-23 | End: 2019-01-23 | Stop reason: HOSPADM

## 2019-01-23 RX ORDER — ONDANSETRON 2 MG/ML
4 INJECTION INTRAMUSCULAR; INTRAVENOUS EVERY 6 HOURS PRN
Status: DISCONTINUED | OUTPATIENT
Start: 2019-01-23 | End: 2019-01-24 | Stop reason: HOSPADM

## 2019-01-23 RX ORDER — SODIUM CHLORIDE, SODIUM LACTATE, POTASSIUM CHLORIDE, CALCIUM CHLORIDE 600; 310; 30; 20 MG/100ML; MG/100ML; MG/100ML; MG/100ML
100 INJECTION, SOLUTION INTRAVENOUS CONTINUOUS
Status: DISCONTINUED | OUTPATIENT
Start: 2019-01-23 | End: 2019-01-23 | Stop reason: HOSPADM

## 2019-01-23 RX ORDER — SODIUM CHLORIDE 0.9 % (FLUSH) 0.9 %
3 SYRINGE (ML) INJECTION AS NEEDED
Status: DISCONTINUED | OUTPATIENT
Start: 2019-01-23 | End: 2019-01-23 | Stop reason: HOSPADM

## 2019-01-23 RX ADMIN — LEVETIRACETAM 500 MG: 5 INJECTION INTRAVENOUS at 16:13

## 2019-01-23 RX ADMIN — POTASSIUM CHLORIDE 20 MEQ: 750 CAPSULE, EXTENDED RELEASE ORAL at 16:13

## 2019-01-23 RX ADMIN — VANCOMYCIN HYDROCHLORIDE 2000 MG: 1 INJECTION, POWDER, LYOPHILIZED, FOR SOLUTION INTRAVENOUS at 19:37

## 2019-01-23 RX ADMIN — SODIUM CHLORIDE 75 ML/HR: 9 INJECTION, SOLUTION INTRAVENOUS at 15:00

## 2019-01-23 RX ADMIN — SODIUM CHLORIDE, POTASSIUM CHLORIDE, SODIUM LACTATE AND CALCIUM CHLORIDE 1000 ML: 600; 310; 30; 20 INJECTION, SOLUTION INTRAVENOUS at 06:27

## 2019-01-23 RX ADMIN — DEXMEDETOMIDINE 20 MCG: 100 INJECTION, SOLUTION, CONCENTRATE INTRAVENOUS at 11:21

## 2019-01-23 RX ADMIN — PANTOPRAZOLE SODIUM 40 MG: 40 TABLET, DELAYED RELEASE ORAL at 16:13

## 2019-01-23 RX ADMIN — DEXMEDETOMIDINE 12 MCG: 100 INJECTION, SOLUTION, CONCENTRATE INTRAVENOUS at 09:05

## 2019-01-23 RX ADMIN — SODIUM CHLORIDE, POTASSIUM CHLORIDE, SODIUM LACTATE AND CALCIUM CHLORIDE: 600; 310; 30; 20 INJECTION, SOLUTION INTRAVENOUS at 08:30

## 2019-01-23 RX ADMIN — INSULIN LISPRO 2 UNITS: 100 INJECTION, SOLUTION INTRAVENOUS; SUBCUTANEOUS at 21:26

## 2019-01-23 RX ADMIN — REMIFENTANIL HYDROCHLORIDE 0.1 MCG/KG/MIN: 1 INJECTION, POWDER, LYOPHILIZED, FOR SOLUTION INTRAVENOUS at 08:08

## 2019-01-23 RX ADMIN — ONDANSETRON 4 MG: 2 INJECTION, SOLUTION INTRAMUSCULAR; INTRAVENOUS at 07:04

## 2019-01-23 RX ADMIN — ESCITALOPRAM 10 MG: 10 TABLET, FILM COATED ORAL at 16:13

## 2019-01-23 RX ADMIN — FUROSEMIDE 20 MG: 20 TABLET ORAL at 16:14

## 2019-01-23 RX ADMIN — DEXMEDETOMIDINE 0.5 MCG/KG/HR: 100 INJECTION, SOLUTION, CONCENTRATE INTRAVENOUS at 08:08

## 2019-01-23 RX ADMIN — DEXAMETHASONE SODIUM PHOSPHATE 4 MG: 4 INJECTION, SOLUTION INTRA-ARTICULAR; INTRALESIONAL; INTRAMUSCULAR; INTRAVENOUS; SOFT TISSUE at 07:04

## 2019-01-23 RX ADMIN — LISINOPRIL 5 MG: 5 TABLET ORAL at 16:13

## 2019-01-23 RX ADMIN — SODIUM CHLORIDE, POTASSIUM CHLORIDE, SODIUM LACTATE AND CALCIUM CHLORIDE: 600; 310; 30; 20 INJECTION, SOLUTION INTRAVENOUS at 11:41

## 2019-01-23 RX ADMIN — SODIUM CITRATE AND CITRIC ACID MONOHYDRATE 30 ML: 500; 334 SOLUTION ORAL at 07:05

## 2019-01-23 RX ADMIN — FAMOTIDINE 20 MG: 10 INJECTION, SOLUTION INTRAVENOUS at 07:05

## 2019-01-23 RX ADMIN — DEXMEDETOMIDINE 60 MCG: 100 INJECTION, SOLUTION, CONCENTRATE INTRAVENOUS at 08:08

## 2019-01-23 RX ADMIN — VANCOMYCIN HYDROCHLORIDE 1000 MG: 1 INJECTION, POWDER, LYOPHILIZED, FOR SOLUTION INTRAVENOUS at 08:00

## 2019-01-23 NOTE — ANESTHESIA PREPROCEDURE EVALUATION
Anesthesia Evaluation     Patient summary reviewed   no history of anesthetic complications:  NPO Solid Status: > 8 hours  NPO Liquid Status: > 8 hours           Airway   Mallampati: II  TM distance: >3 FB  Neck ROM: full  Dental    (+) poor dentition        Pulmonary    (-) asthma, recent URI, sleep apnea, not a smoker  Cardiovascular   Exercise tolerance: poor (<4 METS) (Denies chest pain with exertion, but ambulation is difficult)    ECG reviewed  Patient on routine beta blocker    (+) hypertension, dysrhythmias Atrial Fib, hyperlipidemia,   (-) pacemaker, past MI, angina, cardiac stents, CABG    ROS comment: Last plavix 1/14  Last xarelto 1/14    Neuro/Psych  (+) CVA (2013, residual left leg weakness) residual symptoms, tremors, psychiatric history Depression,     (-) seizures  GI/Hepatic/Renal/Endo    (+) morbid obesity, GERD,  renal disease CRI, diabetes mellitus, hypothyroidism,   (-) liver disease, hyperthyroidism    Musculoskeletal     Abdominal    Substance History      OB/GYN          Other                          Anesthesia Plan    ASA 3     general and MAC   (DBS protocol)  intravenous induction   Anesthetic plan, all risks, benefits, and alternatives have been provided, discussed and informed consent has been obtained with: patient.

## 2019-01-23 NOTE — ANESTHESIA PROCEDURE NOTES
Peripheral IV    Patient location during procedure: OR  Line placed for Fluids/Medication Admin.  Performed By   CRNA: Luma Denton CRNA  Preanesthetic Checklist  Completed: patient identified, site marked, surgical consent, pre-op evaluation, timeout performed, IV checked, risks and benefits discussed and monitors and equipment checked  Peripheral IV Prep   Prep: ChloraPrep  Peripheral IV Procedure   Laterality:left  Location:  Wrist  Catheter size: 18 G         Post Assessment   Dressing Type: transparent and tape.    IV Dressing/Site: clean, dry and intact

## 2019-01-23 NOTE — ANESTHESIA POSTPROCEDURE EVALUATION
Patient: Ross Chandra    Procedure Summary     Date:  01/23/19 Room / Location:   [facility] OR  /  [facility]    Anesthesia Start:  0804 Anesthesia Stop:  1200    Procedure:  BRAIN STIMULATOR STAGE 2 LEAD PLACEMENT (Left Head) Diagnosis:       Essential tremor      (Essential tremor [G25.0])    Surgeon:  Jorge Luis Amos MD Provider:  Luam Denton CRNA    Anesthesia Type:  general, MAC ASA Status:  3          Anesthesia Type: general, MAC  Last vitals  BP   106/59 (01/23/19 1535)   Temp   97.3 °F (36.3 °C) (01/23/19 1247)   Pulse   69 (01/23/19 1535)   Resp   16 (01/23/19 1247)     SpO2   98 % (01/23/19 1535)     Post Anesthesia Care and Evaluation    Patient location during evaluation: PACU  Patient participation: complete - patient participated  Level of consciousness: awake and alert  Pain management: adequate  Airway patency: patent  Anesthetic complications: No anesthetic complications  PONV Status: none  Cardiovascular status: acceptable and hemodynamically stable  Respiratory status: acceptable  Hydration status: acceptable    Comments: Blood pressure 106/59, pulse 69, temperature 97.3 °F (36.3 °C), temperature source Temporal, resp. rate 16, SpO2 98 %.    Patient discharged from PACU based upon Rhoda score. Please see RN notes for further details

## 2019-01-24 VITALS
HEIGHT: 68 IN | BODY MASS INDEX: 46.01 KG/M2 | OXYGEN SATURATION: 98 % | RESPIRATION RATE: 18 BRPM | DIASTOLIC BLOOD PRESSURE: 67 MMHG | TEMPERATURE: 97.6 F | SYSTOLIC BLOOD PRESSURE: 135 MMHG | WEIGHT: 303.6 LBS | HEART RATE: 72 BPM

## 2019-01-24 LAB
ANION GAP SERPL CALCULATED.3IONS-SCNC: 12 MMOL/L (ref 4–13)
BUN BLD-MCNC: 15 MG/DL (ref 5–21)
BUN/CREAT SERPL: 13.3 (ref 7–25)
CALCIUM SPEC-SCNC: 8.2 MG/DL (ref 8.4–10.4)
CHLORIDE SERPL-SCNC: 102 MMOL/L (ref 98–110)
CO2 SERPL-SCNC: 26 MMOL/L (ref 24–31)
CREAT BLD-MCNC: 1.13 MG/DL (ref 0.5–1.4)
DEPRECATED RDW RBC AUTO: 43.8 FL (ref 40–54)
ERYTHROCYTE [DISTWIDTH] IN BLOOD BY AUTOMATED COUNT: 13.2 % (ref 12–15)
GFR SERPL CREATININE-BSD FRML MDRD: 63 ML/MIN/1.73
GLUCOSE BLD-MCNC: 157 MG/DL (ref 70–100)
GLUCOSE BLDC GLUCOMTR-MCNC: 145 MG/DL (ref 70–130)
HCT VFR BLD AUTO: 38.2 % (ref 40–52)
HGB BLD-MCNC: 12.5 G/DL (ref 14–18)
MCH RBC QN AUTO: 29.8 PG (ref 28–32)
MCHC RBC AUTO-ENTMCNC: 32.7 G/DL (ref 33–36)
MCV RBC AUTO: 91 FL (ref 82–95)
PLATELET # BLD AUTO: 208 10*3/MM3 (ref 130–400)
PMV BLD AUTO: 10.7 FL (ref 6–12)
POTASSIUM BLD-SCNC: 4.3 MMOL/L (ref 3.5–5.3)
RBC # BLD AUTO: 4.2 10*6/MM3 (ref 4.8–5.9)
SODIUM BLD-SCNC: 140 MMOL/L (ref 135–145)
WBC NRBC COR # BLD: 12.14 10*3/MM3 (ref 4.8–10.8)

## 2019-01-24 PROCEDURE — 82962 GLUCOSE BLOOD TEST: CPT

## 2019-01-24 PROCEDURE — 99024 POSTOP FOLLOW-UP VISIT: CPT | Performed by: NURSE PRACTITIONER

## 2019-01-24 PROCEDURE — 80048 BASIC METABOLIC PNL TOTAL CA: CPT | Performed by: NURSE PRACTITIONER

## 2019-01-24 PROCEDURE — 25010000002 LEVETIRACETAM IN NACL 0.82% 500 MG/100ML SOLUTION: Performed by: NURSE PRACTITIONER

## 2019-01-24 PROCEDURE — 85027 COMPLETE CBC AUTOMATED: CPT | Performed by: NURSE PRACTITIONER

## 2019-01-24 PROCEDURE — 94760 N-INVAS EAR/PLS OXIMETRY 1: CPT

## 2019-01-24 PROCEDURE — 94799 UNLISTED PULMONARY SVC/PX: CPT

## 2019-01-24 RX ORDER — ASPIRIN 325 MG
325 TABLET, DELAYED RELEASE (ENTERIC COATED) ORAL DAILY
Start: 2019-01-24 | End: 2020-08-27

## 2019-01-24 RX ADMIN — PANTOPRAZOLE SODIUM 40 MG: 40 TABLET, DELAYED RELEASE ORAL at 08:20

## 2019-01-24 RX ADMIN — SODIUM CHLORIDE 75 ML/HR: 9 INJECTION, SOLUTION INTRAVENOUS at 03:56

## 2019-01-24 RX ADMIN — FUROSEMIDE 20 MG: 20 TABLET ORAL at 08:20

## 2019-01-24 RX ADMIN — LISINOPRIL 5 MG: 5 TABLET ORAL at 08:20

## 2019-01-24 RX ADMIN — LEVOTHYROXINE SODIUM 50 MCG: 50 TABLET ORAL at 05:29

## 2019-01-24 RX ADMIN — POTASSIUM CHLORIDE 20 MEQ: 750 CAPSULE, EXTENDED RELEASE ORAL at 08:20

## 2019-01-24 RX ADMIN — LEVETIRACETAM 500 MG: 5 INJECTION INTRAVENOUS at 02:42

## 2019-01-24 RX ADMIN — ESCITALOPRAM 10 MG: 10 TABLET, FILM COATED ORAL at 08:20

## 2019-01-29 ENCOUNTER — TELEPHONE (OUTPATIENT)
Dept: NEUROSURGERY | Facility: CLINIC | Age: 79
End: 2019-01-29

## 2019-01-29 NOTE — TELEPHONE ENCOUNTER
Patient's wife calls to inform us that the patient fell this morning while trying to stand to pull his pants up.  She states her son was there and together they were able to get him out of the floor and back into his chair.  She states he did not hit his head and he is not complaining of any pain at all.  She states he tells her the reason he fell was b/c his legs are weak.  Otherwise, the wife states he has been doing really good since his discharge last Thursday and hasn't complained except for the leg weakness the last 2 days.  Per Dr Amos: watch symptoms and if they worsen let us know but this shouldn't be coming from the previous surgery (DBS lead placement).  Patient's wife does state the patient has an appt w/Dr Joya tomorrow and can discuss this with him as well.    shahid walker CMA

## 2019-01-30 ENCOUNTER — TELEPHONE (OUTPATIENT)
Dept: NEUROSURGERY | Facility: CLINIC | Age: 79
End: 2019-01-30

## 2019-01-30 NOTE — TELEPHONE ENCOUNTER
Patient's wife called back and left another voicemail - returning my call.  I called her back but she didn't answer so I left her a message telling her I would check on him again Monday to make sure he wasn't running a fever and could make sure surgery was a go for Wednesday.    shahid walker CMA

## 2019-01-30 NOTE — TELEPHONE ENCOUNTER
Patient's wife calls today stating she had to take Mr Chandra to the ER last night and he has been diagnosed with the flu.  They gave him Tamiflu.  Per Dr Amos: ok to proceed with surgery next Wednesday as long as he hasn't had a temp 48 hours prior to surgery.    I called her back but had to leave a message asking her to call me back.    shahid walker CMA

## 2019-02-04 ENCOUNTER — TELEPHONE (OUTPATIENT)
Dept: NEUROSURGERY | Facility: CLINIC | Age: 79
End: 2019-02-04

## 2019-02-04 NOTE — TELEPHONE ENCOUNTER
Called to check on the patient to see if he was feeling better and spoke w/his wife.  She states the patient was admitted to the hospital on Thursday and came home Friday b/c he was so weak from the flu.  However, she states they have him on Tamiflu and he is feeling much better today.  She states he hasn't had a temp since coming home from the hospital on Friday.  I will forward this message to Dr Amos for review to make sure he can still proceed with surgery on Wednesday and to see if he should continue the Tamiflu or DC it?    shahid walker CMA

## 2019-02-04 NOTE — TELEPHONE ENCOUNTER
----- Message from Chloé Solano CMA sent at 1/30/2019 12:37 PM CST -----  Call & check on patient to make sure he hasn't had a temp so he can proceed with surgery on Wednesday

## 2019-02-05 NOTE — TELEPHONE ENCOUNTER
Ok to proceed per Dr Amos  Ok to continue the Tamiflu    Patient's wife notified      shahid walker CMA

## 2019-02-06 ENCOUNTER — HOSPITAL ENCOUNTER (OUTPATIENT)
Facility: HOSPITAL | Age: 79
Setting detail: SURGERY ADMIT
Discharge: HOME | End: 2019-02-06
Attending: NEUROLOGICAL SURGERY | Admitting: NEUROLOGICAL SURGERY

## 2019-02-06 ENCOUNTER — ANESTHESIA (OUTPATIENT)
Dept: PERIOP | Facility: HOSPITAL | Age: 79
End: 2019-02-06

## 2019-02-06 ENCOUNTER — ANESTHESIA EVENT (OUTPATIENT)
Dept: PERIOP | Facility: HOSPITAL | Age: 79
End: 2019-02-06

## 2019-02-06 VITALS
SYSTOLIC BLOOD PRESSURE: 116 MMHG | WEIGHT: 282.85 LBS | TEMPERATURE: 97.2 F | DIASTOLIC BLOOD PRESSURE: 68 MMHG | OXYGEN SATURATION: 95 % | HEART RATE: 80 BPM | RESPIRATION RATE: 14 BRPM | BODY MASS INDEX: 40.49 KG/M2 | HEIGHT: 70 IN

## 2019-02-06 DIAGNOSIS — G25.0 ESSENTIAL TREMOR: ICD-10-CM

## 2019-02-06 LAB
ABO GROUP BLD: NORMAL
ALBUMIN SERPL-MCNC: 4.1 G/DL (ref 3.5–5)
ALBUMIN/GLOB SERPL: 1.2 G/DL (ref 1.1–2.5)
ALP SERPL-CCNC: 73 U/L (ref 24–120)
ALT SERPL W P-5'-P-CCNC: 26 U/L (ref 0–54)
ANION GAP SERPL CALCULATED.3IONS-SCNC: 11 MMOL/L (ref 4–13)
AST SERPL-CCNC: 39 U/L (ref 7–45)
BILIRUB SERPL-MCNC: 0.9 MG/DL (ref 0.1–1)
BILIRUB UR QL STRIP: ABNORMAL
BLD GP AB SCN SERPL QL: NEGATIVE
BUN BLD-MCNC: 14 MG/DL (ref 5–21)
BUN/CREAT SERPL: 10.4 (ref 7–25)
CALCIUM SPEC-SCNC: 8.7 MG/DL (ref 8.4–10.4)
CHLORIDE SERPL-SCNC: 106 MMOL/L (ref 98–110)
CLARITY UR: CLEAR
CO2 SERPL-SCNC: 25 MMOL/L (ref 24–31)
COLOR UR: ABNORMAL
CREAT BLD-MCNC: 1.34 MG/DL (ref 0.5–1.4)
GFR SERPL CREATININE-BSD FRML MDRD: 52 ML/MIN/1.73
GLOBULIN UR ELPH-MCNC: 3.4 GM/DL
GLUCOSE BLD-MCNC: 156 MG/DL (ref 70–100)
GLUCOSE BLDC GLUCOMTR-MCNC: 154 MG/DL (ref 70–130)
GLUCOSE BLDC GLUCOMTR-MCNC: 176 MG/DL (ref 70–130)
GLUCOSE UR STRIP-MCNC: NEGATIVE MG/DL
HGB UR QL STRIP.AUTO: NEGATIVE
KETONES UR QL STRIP: NEGATIVE
LEUKOCYTE ESTERASE UR QL STRIP.AUTO: NEGATIVE
NITRITE UR QL STRIP: NEGATIVE
PH UR STRIP.AUTO: <=5 [PH] (ref 5–8)
POTASSIUM BLD-SCNC: 3.8 MMOL/L (ref 3.5–5.3)
PROT SERPL-MCNC: 7.5 G/DL (ref 6.3–8.7)
PROT UR QL STRIP: NEGATIVE
RH BLD: POSITIVE
SODIUM BLD-SCNC: 142 MMOL/L (ref 135–145)
SP GR UR STRIP: >=1.03 (ref 1–1.03)
T&S EXPIRATION DATE: NORMAL
UROBILINOGEN UR QL STRIP: ABNORMAL

## 2019-02-06 PROCEDURE — C1883 ADAPT/EXT, PACING/NEURO LEAD: HCPCS | Performed by: NEUROLOGICAL SURGERY

## 2019-02-06 PROCEDURE — 86850 RBC ANTIBODY SCREEN: CPT | Performed by: NEUROLOGICAL SURGERY

## 2019-02-06 PROCEDURE — 25010000002 VANCOMYCIN 1 G RECONSTITUTED SOLUTION: Performed by: NEUROLOGICAL SURGERY

## 2019-02-06 PROCEDURE — C1767 GENERATOR, NEURO NON-RECHARG: HCPCS | Performed by: NEUROLOGICAL SURGERY

## 2019-02-06 PROCEDURE — 25010000002 DEXAMETHASONE PER 1 MG: Performed by: NURSE ANESTHETIST, CERTIFIED REGISTERED

## 2019-02-06 PROCEDURE — 81003 URINALYSIS AUTO W/O SCOPE: CPT | Performed by: NEUROLOGICAL SURGERY

## 2019-02-06 PROCEDURE — 82962 GLUCOSE BLOOD TEST: CPT

## 2019-02-06 PROCEDURE — 25010000002 NEOSTIGMINE PER 0.5 MG: Performed by: NURSE ANESTHETIST, CERTIFIED REGISTERED

## 2019-02-06 PROCEDURE — 86901 BLOOD TYPING SEROLOGIC RH(D): CPT | Performed by: NEUROLOGICAL SURGERY

## 2019-02-06 PROCEDURE — 86900 BLOOD TYPING SEROLOGIC ABO: CPT | Performed by: NEUROLOGICAL SURGERY

## 2019-02-06 PROCEDURE — 25010000002 PROPOFOL 10 MG/ML EMULSION: Performed by: NURSE ANESTHETIST, CERTIFIED REGISTERED

## 2019-02-06 PROCEDURE — 25010000002 ONDANSETRON PER 1 MG: Performed by: NURSE ANESTHETIST, CERTIFIED REGISTERED

## 2019-02-06 PROCEDURE — C1787 PATIENT PROGR, NEUROSTIM: HCPCS | Performed by: NEUROLOGICAL SURGERY

## 2019-02-06 PROCEDURE — 61885 INSRT/REDO NEUROSTIM 1 ARRAY: CPT | Performed by: NEUROLOGICAL SURGERY

## 2019-02-06 PROCEDURE — 80053 COMPREHEN METABOLIC PANEL: CPT | Performed by: NEUROLOGICAL SURGERY

## 2019-02-06 DEVICE — HEMO ABS GELFOAM PWDR PORCN 1GM: Type: IMPLANTABLE DEVICE | Status: FUNCTIONAL

## 2019-02-06 DEVICE — IMPLANTABLE DEVICE: Type: IMPLANTABLE DEVICE | Status: FUNCTIONAL

## 2019-02-06 RX ORDER — MAGNESIUM HYDROXIDE 1200 MG/15ML
LIQUID ORAL AS NEEDED
Status: DISCONTINUED | OUTPATIENT
Start: 2019-02-06 | End: 2019-02-06 | Stop reason: HOSPADM

## 2019-02-06 RX ORDER — DEXAMETHASONE SODIUM PHOSPHATE 4 MG/ML
INJECTION, SOLUTION INTRA-ARTICULAR; INTRALESIONAL; INTRAMUSCULAR; INTRAVENOUS; SOFT TISSUE AS NEEDED
Status: DISCONTINUED | OUTPATIENT
Start: 2019-02-06 | End: 2019-02-06 | Stop reason: SURG

## 2019-02-06 RX ORDER — ONDANSETRON 2 MG/ML
INJECTION INTRAMUSCULAR; INTRAVENOUS AS NEEDED
Status: DISCONTINUED | OUTPATIENT
Start: 2019-02-06 | End: 2019-02-06 | Stop reason: SURG

## 2019-02-06 RX ORDER — LIDOCAINE HYDROCHLORIDE 40 MG/ML
SOLUTION TOPICAL AS NEEDED
Status: DISCONTINUED | OUTPATIENT
Start: 2019-02-06 | End: 2019-02-06 | Stop reason: SURG

## 2019-02-06 RX ORDER — OXYCODONE AND ACETAMINOPHEN 10; 325 MG/1; MG/1
1 TABLET ORAL ONCE AS NEEDED
Status: DISCONTINUED | OUTPATIENT
Start: 2019-02-06 | End: 2019-02-06 | Stop reason: HOSPADM

## 2019-02-06 RX ORDER — SODIUM CHLORIDE, SODIUM LACTATE, POTASSIUM CHLORIDE, CALCIUM CHLORIDE 600; 310; 30; 20 MG/100ML; MG/100ML; MG/100ML; MG/100ML
1000 INJECTION, SOLUTION INTRAVENOUS CONTINUOUS
Status: DISCONTINUED | OUTPATIENT
Start: 2019-02-06 | End: 2019-02-06 | Stop reason: HOSPADM

## 2019-02-06 RX ORDER — GLYCOPYRROLATE 0.2 MG/ML
INJECTION INTRAMUSCULAR; INTRAVENOUS AS NEEDED
Status: DISCONTINUED | OUTPATIENT
Start: 2019-02-06 | End: 2019-02-06 | Stop reason: SURG

## 2019-02-06 RX ORDER — ACETAMINOPHEN 500 MG
1000 TABLET ORAL ONCE
Status: DISCONTINUED | OUTPATIENT
Start: 2019-02-06 | End: 2019-02-06 | Stop reason: HOSPADM

## 2019-02-06 RX ORDER — HYDROCODONE BITARTRATE AND ACETAMINOPHEN 7.5; 325 MG/1; MG/1
1 TABLET ORAL EVERY 8 HOURS PRN
Qty: 90 TABLET | Refills: 0 | Status: SHIPPED | OUTPATIENT
Start: 2019-02-06 | End: 2020-08-27

## 2019-02-06 RX ORDER — VANCOMYCIN HYDROCHLORIDE 1 G/200ML
1 INJECTION, SOLUTION INTRAVENOUS ONCE
Status: DISCONTINUED | OUTPATIENT
Start: 2019-02-06 | End: 2019-02-06

## 2019-02-06 RX ORDER — PROPOFOL 10 MG/ML
VIAL (ML) INTRAVENOUS AS NEEDED
Status: DISCONTINUED | OUTPATIENT
Start: 2019-02-06 | End: 2019-02-06 | Stop reason: SURG

## 2019-02-06 RX ORDER — SODIUM CHLORIDE 0.9 % (FLUSH) 0.9 %
3 SYRINGE (ML) INJECTION EVERY 12 HOURS SCHEDULED
Status: DISCONTINUED | OUTPATIENT
Start: 2019-02-06 | End: 2019-02-06 | Stop reason: HOSPADM

## 2019-02-06 RX ORDER — SODIUM CHLORIDE, SODIUM LACTATE, POTASSIUM CHLORIDE, CALCIUM CHLORIDE 600; 310; 30; 20 MG/100ML; MG/100ML; MG/100ML; MG/100ML
100 INJECTION, SOLUTION INTRAVENOUS CONTINUOUS
Status: DISCONTINUED | OUTPATIENT
Start: 2019-02-06 | End: 2019-02-06 | Stop reason: HOSPADM

## 2019-02-06 RX ORDER — ONDANSETRON 2 MG/ML
4 INJECTION INTRAMUSCULAR; INTRAVENOUS ONCE AS NEEDED
Status: DISCONTINUED | OUTPATIENT
Start: 2019-02-06 | End: 2019-02-06 | Stop reason: HOSPADM

## 2019-02-06 RX ORDER — ROCURONIUM BROMIDE 10 MG/ML
INJECTION, SOLUTION INTRAVENOUS AS NEEDED
Status: DISCONTINUED | OUTPATIENT
Start: 2019-02-06 | End: 2019-02-06 | Stop reason: SURG

## 2019-02-06 RX ORDER — VASOPRESSIN 20 U/ML
INJECTION PARENTERAL AS NEEDED
Status: DISCONTINUED | OUTPATIENT
Start: 2019-02-06 | End: 2019-02-06 | Stop reason: SURG

## 2019-02-06 RX ORDER — LIDOCAINE HYDROCHLORIDE 20 MG/ML
INJECTION, SOLUTION INFILTRATION; PERINEURAL AS NEEDED
Status: DISCONTINUED | OUTPATIENT
Start: 2019-02-06 | End: 2019-02-06 | Stop reason: SURG

## 2019-02-06 RX ORDER — IPRATROPIUM BROMIDE AND ALBUTEROL SULFATE 2.5; .5 MG/3ML; MG/3ML
3 SOLUTION RESPIRATORY (INHALATION) ONCE AS NEEDED
Status: DISCONTINUED | OUTPATIENT
Start: 2019-02-06 | End: 2019-02-06 | Stop reason: HOSPADM

## 2019-02-06 RX ORDER — LABETALOL HYDROCHLORIDE 5 MG/ML
5 INJECTION, SOLUTION INTRAVENOUS
Status: DISCONTINUED | OUTPATIENT
Start: 2019-02-06 | End: 2019-02-06 | Stop reason: HOSPADM

## 2019-02-06 RX ORDER — SODIUM CHLORIDE 9 MG/ML
INJECTION, SOLUTION INTRAVENOUS AS NEEDED
Status: DISCONTINUED | OUTPATIENT
Start: 2019-02-06 | End: 2019-02-06 | Stop reason: HOSPADM

## 2019-02-06 RX ORDER — PHENYLEPHRINE HCL IN 0.9% NACL 0.8MG/10ML
SYRINGE (ML) INTRAVENOUS AS NEEDED
Status: DISCONTINUED | OUTPATIENT
Start: 2019-02-06 | End: 2019-02-06 | Stop reason: SURG

## 2019-02-06 RX ORDER — FENTANYL CITRATE 50 UG/ML
25 INJECTION, SOLUTION INTRAMUSCULAR; INTRAVENOUS AS NEEDED
Status: DISCONTINUED | OUTPATIENT
Start: 2019-02-06 | End: 2019-02-06 | Stop reason: HOSPADM

## 2019-02-06 RX ORDER — NALOXONE HCL 0.4 MG/ML
0.4 VIAL (ML) INJECTION AS NEEDED
Status: DISCONTINUED | OUTPATIENT
Start: 2019-02-06 | End: 2019-02-06 | Stop reason: HOSPADM

## 2019-02-06 RX ORDER — SODIUM CHLORIDE 0.9 % (FLUSH) 0.9 %
3 SYRINGE (ML) INJECTION AS NEEDED
Status: DISCONTINUED | OUTPATIENT
Start: 2019-02-06 | End: 2019-02-06 | Stop reason: HOSPADM

## 2019-02-06 RX ORDER — SODIUM CHLORIDE 0.9 % (FLUSH) 0.9 %
1-10 SYRINGE (ML) INJECTION AS NEEDED
Status: DISCONTINUED | OUTPATIENT
Start: 2019-02-06 | End: 2019-02-06 | Stop reason: HOSPADM

## 2019-02-06 RX ADMIN — LIDOCAINE HYDROCHLORIDE 100 MG: 20 INJECTION, SOLUTION INFILTRATION; PERINEURAL at 07:43

## 2019-02-06 RX ADMIN — LIDOCAINE HYDROCHLORIDE 1 EACH: 40 SOLUTION TOPICAL at 07:43

## 2019-02-06 RX ADMIN — SODIUM CHLORIDE, POTASSIUM CHLORIDE, SODIUM LACTATE AND CALCIUM CHLORIDE 1000 ML: 600; 310; 30; 20 INJECTION, SOLUTION INTRAVENOUS at 06:26

## 2019-02-06 RX ADMIN — ROCURONIUM BROMIDE 25 MG: 10 INJECTION INTRAVENOUS at 07:43

## 2019-02-06 RX ADMIN — VASOPRESSIN 1 UNITS: 20 INJECTION INTRAVENOUS at 08:44

## 2019-02-06 RX ADMIN — Medication 5 MG: at 08:59

## 2019-02-06 RX ADMIN — Medication 160 MCG: at 07:54

## 2019-02-06 RX ADMIN — GLYCOPYRROLATE 0.4 MG: 0.2 INJECTION, SOLUTION INTRAMUSCULAR; INTRAVENOUS at 08:59

## 2019-02-06 RX ADMIN — PROPOFOL 120 MG: 10 INJECTION, EMULSION INTRAVENOUS at 07:43

## 2019-02-06 RX ADMIN — VASOPRESSIN 2 UNITS: 20 INJECTION INTRAVENOUS at 08:53

## 2019-02-06 RX ADMIN — VASOPRESSIN 2 UNITS: 20 INJECTION INTRAVENOUS at 07:52

## 2019-02-06 RX ADMIN — SODIUM CHLORIDE, POTASSIUM CHLORIDE, SODIUM LACTATE AND CALCIUM CHLORIDE 1000 ML: 600; 310; 30; 20 INJECTION, SOLUTION INTRAVENOUS at 09:55

## 2019-02-06 RX ADMIN — Medication 160 MCG: at 07:49

## 2019-02-06 RX ADMIN — Medication 160 MCG: at 07:53

## 2019-02-06 RX ADMIN — VASOPRESSIN 2 UNITS: 20 INJECTION INTRAVENOUS at 07:50

## 2019-02-06 RX ADMIN — VASOPRESSIN 1 UNITS: 20 INJECTION INTRAVENOUS at 08:47

## 2019-02-06 RX ADMIN — Medication 160 MCG: at 07:51

## 2019-02-06 RX ADMIN — DEXAMETHASONE SODIUM PHOSPHATE 4 MG: 4 INJECTION, SOLUTION INTRAMUSCULAR; INTRAVENOUS at 08:05

## 2019-02-06 RX ADMIN — VANCOMYCIN HYDROCHLORIDE 1000 MG: 1 INJECTION, POWDER, LYOPHILIZED, FOR SOLUTION INTRAVENOUS at 07:12

## 2019-02-06 RX ADMIN — ONDANSETRON HYDROCHLORIDE 4 MG: 2 SOLUTION INTRAMUSCULAR; INTRAVENOUS at 08:05

## 2019-02-06 NOTE — ANESTHESIA PROCEDURE NOTES
Airway  Urgency: elective    Airway not difficult    General Information and Staff    Patient location during procedure: OR  CRNA: TERESA Arevalo CRNA    Indications and Patient Condition  Indications for airway management: airway protection    Preoxygenated: yes  MILS maintained throughout  Mask difficulty assessment: 1 - vent by mask    Final Airway Details  Final airway type: endotracheal airway      Successful airway: ETT  Cuffed: yes   Successful intubation technique: direct laryngoscopy  Facilitating devices/methods: intubating stylet  Endotracheal tube insertion site: oral  Blade: Galindo  Blade size: 4  ETT size (mm): 8.0  Cormack-Lehane Classification: grade I - full view of glottis  Placement verified by: chest auscultation and capnometry   Cuff volume (mL): 5  Measured from: lips  ETT to lips (cm): 22  Number of attempts at approach: 1

## 2019-02-06 NOTE — ANESTHESIA PREPROCEDURE EVALUATION
Anesthesia Evaluation     Patient summary reviewed   no history of anesthetic complications:  NPO Solid Status: > 8 hours  NPO Liquid Status: > 8 hours           Airway   Mallampati: II  TM distance: >3 FB  Neck ROM: full  Dental    (+) poor dentition        Pulmonary    (-) asthma, recent URI, sleep apnea, not a smoker  Cardiovascular   Exercise tolerance: poor (<4 METS) (Denies chest pain with exertion, but ambulation is difficult)    ECG reviewed  Patient on routine beta blocker    (+) hypertension, dysrhythmias Atrial Fib, hyperlipidemia,   (-) pacemaker, past MI, angina, cardiac stents    ROS comment: Last plavix 1/14  Last xarelto 1/14    Neuro/Psych  (+) CVA (2013, residual left leg weakness) residual symptoms, tremors, psychiatric history Depression,     (-) seizures  GI/Hepatic/Renal/Endo    (+) morbid obesity, GERD,  renal disease CRI, diabetes mellitus, hypothyroidism,   (-) liver disease, hyperthyroidism    Musculoskeletal     Abdominal    Substance History      OB/GYN          Other                        Anesthesia Plan    ASA 3     general     intravenous induction   Anesthetic plan, all risks, benefits, and alternatives have been provided, discussed and informed consent has been obtained with: patient.

## 2019-02-06 NOTE — OP NOTE
Procedure Note  Preop Diagnosis: Essential tremor [G25.0]    Post-Op Diagnosis Codes:     * Essential tremor [G25.0]     Procedure Name:   Procedure Note  Preop Diagnosis: Essential tremor [G25.0]    Post-Op Diagnosis Codes:     * Essential tremor [G25.0]     Procedure Name: Implantation of deep brain stimulator battery pack    Indications:  A clinical exam revealed findings of essential tremor. The patient now presents for implantation of DBS battery after discussing therapeutic alternatives.          Surgeon: Jorge Luis Amos MD     Assistants: None    Anesthesia: General endotracheal anesthesia    ASA Class: 3    Procedure Details   After obtaining informed consent, having the risks and benefits of the procedure explained including but not limited to infection, bleeding, damage to the DBS lead resulting in malfunction of the system, stroke, coma, and death.  The patient was brought to the afternoon.  He was given general anesthesia via endotracheal tube.  He is laid supine on operating table.  His head was placed on a foam donut and turned to the right.  The previous location of the DBS lead was identified in the left scalp.  Preplanned incision on the left scalp and left anterior chest was marked with indelible marker.  The patient was then prepped and draped in a standard sterile fashion.  The preplanned incision was infiltrated Marcaine and epinephrine at the chest.  A 10 blade scalpel was used to make an incision for dermis epidermis.  Bovie cautery was used to extend the incision through subcu anus and soft tissues and then a pocket was created in the subcutaneous tissues using Bovie cautery.  A 10 blade scalpel was used to make an incision in the left scalp.  Bovie cautery was used to extend the incision to the subcutaneous tissues to the level of the boot covering the lead.  Once this was identified it was dissected free using Metzenbaum scissors from its surrounding scar tissue.  The lead was explanted  from the wound.  A hemostat and then a uterine forcep was used to create a tunnel and pocket towards the anterior chest wound.  A passer was then passed over the clavicle subcutaneously to the anterior chest wound.  The lead was then pulled up through the tunnel.  The boots were then removed from the DBS lead and the DBS lead was connected to the lead extension.  The boot was anchored in place using silk ties.  The extension was attached to the battery system.  The battery system was interrogated for impedance which was found to be satisfactory.  The battery system was then implanted into the pocket it was anchored to the surrounding fascia using an Ethibond suture.  The wounds were then copiously irrigated with antibiotic solution there inspected for hemostasis.  The subcutaneous tissues were closed using a series of inverted interrupted 2-0 Vicryl sutures.  And the skin was closed in a running 4-0 Monocryl.  All sponge needle and instrument counts were correct at the end of the procedure.  The patient was extubated in stable condition returned to recovery with about 20 cc of blood loss.    Findings:      Estimated Blood Loss:  20           Drains: NONE           Total IV Fluids: ml           Specimens: None           Implants:   Implant Name Type Inv. Item Serial No.  Lot No. LRB No. Used   EXT LD STIM DBS - [id number] - [id number] Implant EXT LD STIM DBS [id number] [employer]  N/A 1   NEUROSTIM ACTIVA SC M/PROG - [id number] - [id number] Implant NEUROSTIM ACTIVA SC M/PROG [id number] [employer]  N/A 1   GELFOAM PWDR 1GM - [id number] Implant GELFOAM PWDR 1GM  [employer]  N/A 1              Complications: None           Disposition: PACU - hemodynamically stable.           Condition: stable        Jorge Luis Amos MD      I

## 2019-02-06 NOTE — DISCHARGE INSTRUCTIONS

## 2019-02-06 NOTE — ANESTHESIA POSTPROCEDURE EVALUATION
"Patient: Ross Chandra    Procedure Summary     Date:  02/06/19 Room / Location:   [facility]  /  [facility]    Anesthesia Start:  0741 Anesthesia Stop:  0910    Procedure:  CRANIAL NEUROSTIMULATOR INSERTION OR REPLACEMENT (N/A Chest) Diagnosis:       Essential tremor      (Essential tremor [G25.0])    Surgeon:  Jorge Luis Amos MD Provider:  TERESA Arevalo CRNA    Anesthesia Type:  general ASA Status:  3          Anesthesia Type: general  Last vitals  BP   138/72 (02/06/19 1100)   Temp   97.2 °F (36.2 °C) (02/06/19 0912)   Pulse   81 (02/06/19 1100)   Resp   16 (02/06/19 1100)     SpO2   97 % (02/06/19 1110)     Post Anesthesia Care and Evaluation    Patient location during evaluation: PACU  Patient participation: complete - patient participated  Level of consciousness: awake and alert  Pain management: adequate  Airway patency: patent  Anesthetic complications: No anesthetic complications    Cardiovascular status: acceptable  Respiratory status: acceptable  Hydration status: acceptable    Comments: Blood pressure 138/72, pulse 81, temperature 97.2 °F (36.2 °C), temperature source Temporal, resp. rate 16, height 177.8 cm (70\"), weight 128 kg (282 lb 13.6 oz), SpO2 97 %.    Pt discharged from PACU based on ovidio score >8      "

## 2019-02-28 ENCOUNTER — OFFICE VISIT (OUTPATIENT)
Dept: NEUROSURGERY | Facility: CLINIC | Age: 79
End: 2019-02-28

## 2019-02-28 VITALS — WEIGHT: 282.19 LBS | BODY MASS INDEX: 40.4 KG/M2 | HEIGHT: 70 IN

## 2019-02-28 DIAGNOSIS — R25.1 TREMOR: Primary | ICD-10-CM

## 2019-02-28 DIAGNOSIS — Z78.9 NON-TOBACCO USER: ICD-10-CM

## 2019-02-28 PROCEDURE — 99024 POSTOP FOLLOW-UP VISIT: CPT | Performed by: NEUROLOGICAL SURGERY

## 2019-02-28 RX ORDER — OSELTAMIVIR PHOSPHATE 75 MG/1
CAPSULE ORAL
COMMUNITY
Start: 2019-01-30 | End: 2019-04-17

## 2019-02-28 NOTE — PROGRESS NOTES
SUBJECTIVE:  Patient ID: Ross Chandra is a 78 y.o. male is here today for follow-up.    Chief Complaint: Essential tremor  Chief Complaint   Patient presents with   • Tremors     patient had DBS lead placed on 1/23/19 and the battery was placed on 2/6/19.       HPI  78-year-old gentleman that went to the operating room for a left deep brain stimulator lead placement on January 23, 2019 and battery placement on February 6, 2019.    The following portions of the patient's history were reviewed and updated as appropriate: allergies, current medications, past family history, past medical history, past social history, past surgical history and problem list.    OBJECTIVE:    Review of Systems       Physical Exam    Neurologic Exam  Mental Status   Oriented to person, place, and time.   Attention: normal.   Speech: speech is normal   Level of consciousness: alert     Cranial Nerves   Cranial nerves II through XII intact.      Motor Exam   Muscle bulk: normal  Overall muscle tone: normal  Right arm pronator drift: absent  Left arm pronator drift: absent     Strength   Strength 5/5 throughout.      Sensory Exam   Light touch normal.   Pinprick normal.      Gait, Coordination, and Reflexes      Gait  Gait: (Unsteady shuffling)Some mild resting tremor in the right upper extremity significant right upper extremity intention and action tremor.       Independent Review of Radiographic Studies:       ASSESSMENT/PLAN:  78-year-old gentleman underwent deep brain stimulator lead placement he is tremor essentially is at the baseline.  He has not followed up with neurology for his first programming sessions.  We are going to make the referral and get that scheduled as soon as possible.  We will see him in follow-up in 8-10 weeks.      1. Tremor    2. Non-tobacco user            No Follow-up on file.      Jorge Luis Amos MD

## 2019-03-07 NOTE — ANESTHESIA POSTPROCEDURE EVALUATION
"Patient: Ross Chandra    Procedure Summary     Date:  01/16/19 Room / Location:  [facility] CT; [facility] PREOP PHASE II    Anesthesia Start:  0741 Anesthesia Stop:  0827    Procedures:       PLACEMENT OF ANCHORS      CT HEAD W CONTRAST Diagnosis:  Tremor    Scheduled Providers:   Provider:  Aminata Warner CRNA    Anesthesia Type:  general ASA Status:  3          Anesthesia Type: general  Last vitals  BP   101/57 (01/16/19 0815)   Temp   97.8 °F (36.6 °C) (01/16/19 0649)   Pulse   72 (01/16/19 0815)   Resp   17 (01/16/19 0732)     SpO2   99 % (01/16/19 0815)     Post Anesthesia Care and Evaluation    Patient location during evaluation: PHASE II  Patient participation: complete - patient participated  Level of consciousness: awake and awake and alert  Pain score: 0  Pain management: adequate  Airway patency: patent  Anesthetic complications: No anesthetic complications  PONV Status: none  Cardiovascular status: acceptable  Respiratory status: acceptable  Hydration status: acceptable    Comments: Patient discharged according to acceptable Rhoda score per RN assessment. See nursing records for further information.     Blood pressure 101/57, pulse 72, temperature 97.8 °F (36.6 °C), temperature source Temporal, resp. rate 17, height 172.6 cm (67.95\"), weight 135 kg (298 lb 1 oz), SpO2 99 %.        "
No difficulties

## 2019-03-13 ENCOUNTER — OFFICE VISIT (OUTPATIENT)
Dept: NEUROLOGY | Facility: CLINIC | Age: 79
End: 2019-03-13

## 2019-03-13 VITALS
BODY MASS INDEX: 40.37 KG/M2 | WEIGHT: 282 LBS | HEART RATE: 62 BPM | SYSTOLIC BLOOD PRESSURE: 132 MMHG | DIASTOLIC BLOOD PRESSURE: 78 MMHG | HEIGHT: 70 IN

## 2019-03-13 DIAGNOSIS — R25.1 TREMOR: Primary | ICD-10-CM

## 2019-03-13 DIAGNOSIS — Z96.89 S/P DEEP BRAIN STIMULATOR PLACEMENT: ICD-10-CM

## 2019-03-13 PROCEDURE — 99204 OFFICE O/P NEW MOD 45 MIN: CPT | Performed by: PHYSICIAN ASSISTANT

## 2019-03-13 PROCEDURE — 95977 ALYS CPLX CN NPGT PRGRMG: CPT | Performed by: PHYSICIAN ASSISTANT

## 2019-03-13 RX ORDER — CLOPIDOGREL BISULFATE 75 MG/1
75 TABLET ORAL DAILY
COMMUNITY

## 2019-03-13 NOTE — PROGRESS NOTES
Subjective   Ross Chandra is a 78 y.o. male is being seen for consultation today at the request of Jorge Luis Amos MD    DBS activation visit.  DBS placed for essential tremor, dominant on the right.  The patient is right-hand dominant.  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  Substantial past medical history also includes diabetes, atrial fibrillation, congestive heart failure, hypothyroidism, hyperlipidemia and vertebral artery stenosis.    Left thalamic lead placed 23 January 2019 without complications.  Generator and lead extension placed 6 February 2019 without complication.  DBS was placed by Dr. Jorge Luis Amos.  Neurostimulator model Activa SC-b.  Serial number [id number].  Model #[id number].  Firm where version 2.20.    The patient's tremor history is of progressive worsening tremor particularly since 2013.  This affects dominantly the right upper extremity which is his dominant hand with the left upper extremity being affected by his stroke.  Patient failed medical therapy for tremor and elected to have a deep brain stimulator placed.  Since surgery the patient has noticed no change in his tremor.  He has had no difficulty with wound healing or other unexpected consequences.  The patient notes that his tremor is worse with intention and interferes with all aspects of daily living.  The patient's gait is somewhat impaired secondary to his stroke residual.  The tremor reduces at rest and is not positively affected by any medications.  The tremor does not affect sleep.  Illness and other stress aggravates the tremor.  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  The patient is hoping to obtain more functional use of the right upper extremity with activation of his DBS unit.         The following portions of the patient's history were reviewed and updated as appropriate: allergies, current medications, past  family history, past medical history, past social history, past surgical history and problem list.    Review of Systems   Constitutional: Positive for fatigue.   HENT: Negative.    Eyes: Negative.    Respiratory: Positive for shortness of breath.    Cardiovascular: Negative.    Gastrointestinal: Negative.    Endocrine: Negative.    Genitourinary: Negative.    Musculoskeletal: Positive for gait problem.   Skin: Negative.    Allergic/Immunologic: Negative.    Neurological: Positive for tremors, weakness and numbness. Negative for facial asymmetry and speech difficulty.   Hematological: Negative.    Psychiatric/Behavioral: Positive for sleep disturbance.       Objective   Physical Exam   Constitutional: He is oriented to person, place, and time.   HENT:   Head: Normocephalic.   Right Ear: Hearing and external ear normal.   Left Ear: Hearing and external ear normal.   Nose: Nose normal.   Mouth/Throat: Uvula is midline, oropharynx is clear and moist and mucous membranes are normal.   Left frontal cranial wound and other associated DBS placement wounds appear to be healing well and without complication.   Eyes: Conjunctivae, EOM and lids are normal. Pupils are equal, round, and reactive to light.   Neck: Trachea normal, normal range of motion and phonation normal. No JVD present. Carotid bruit is not present.   Cardiovascular: Normal rate, S1 normal and S2 normal. An irregular rhythm present.   Pulmonary/Chest: Effort normal and breath sounds normal.   Left anterior upper chest deep brain stimulator generator and lead are palpable.  His wound is well-healed.   Abdominal: Soft. Normal appearance.   Neurological: He is alert and oriented to person, place, and time. He displays atrophy and tremor. No cranial nerve deficit or sensory deficit. He exhibits normal muscle tone. Gait abnormal. Coordination normal. GCS eye subscore is 4. GCS verbal subscore is 5. GCS motor subscore is 6.   Reflex Scores:       Tricep reflexes are  1+ on the right side and 1+ on the left side.       Bicep reflexes are 1+ on the right side and 1+ on the left side.       Brachioradialis reflexes are 1+ on the right side and 1+ on the left side.       Patellar reflexes are 1+ on the right side and 1+ on the left side.       Achilles reflexes are 1+ on the right side and 1+ on the left side.  Left hemiparesis with residual functional but reduced strength on the left with left-sided hemiapraxia, worse in the upper extremity.    High-frequency, low to moderate amplitude tremor affecting the upper extremity on the right.  Tremor is much exacerbated with intention and effort.  No significant tremor is noted in the left upper extremity.    The patient is reexamined during and after multiple programming modes.  The patient's examination subsequent to activation and programming of the DBS unit shows much improved tremor on the right.  His neurologic exam showed no other changes than this improvement post programming.   Skin: Skin is warm, dry and intact.   Psychiatric: He has a normal mood and affect. His speech is normal and behavior is normal. Judgment and thought content normal. Cognition and memory are normal.         Assessment/Plan   Ross was seen today for tremors.    Diagnoses and all orders for this visit:    Tremor    S/P deep brain stimulator placement    The patient is healing well and neurologically stable subsequent to DBS placement.  DBS activation is discussed in detail with the patient and his family.  They expressed the desire to proceed.    The patient's DBS unit reveals a battery level of 3.07 V.  Monopolar and bipolar left VIM electrode impedance shows no problems.    Unit is activated initially utilizing the distal, 0 electrode.  The voltage is gradually increased without adverse effect.  Dramatic reduction in the right upper extremity tremor is noted.  In a serial fashion subsequent electrodes 1 through 3 are similarly activated as the negative  electrode.  Clinical evaluation shows that the most improvement came from the 0 electrode negative, pulse width 90 µs, 2 V.  The improvement at the settings is quite dramatic.    Today's findings, thorough demonstration of the home  for activation and inactivation of the DBS unit, expectations, follow-up recommendations, patient and family questions are reviewed in detail.  The patient will be seen back in a month for evaluation and if necessary, reprogramming.           Dictated utilizing Dragon dictation.

## 2019-03-19 NOTE — PROGRESS NOTES
I have reviewed the notes, assessments, and/or procedures performed by MARILYN Uribe, I concur with her/his documentation of Ross Chandra.  Orion Glez MD

## 2019-03-21 ENCOUNTER — TELEPHONE (OUTPATIENT)
Dept: NEUROLOGY | Facility: CLINIC | Age: 79
End: 2019-03-21

## 2019-03-21 NOTE — TELEPHONE ENCOUNTER
----- Message from MARILYN Macias sent at 3/20/2019  5:09 PM CDT -----  I discussed this with his family, they will call with an update.      ----- Message -----  From: Gabi Mendez LPN  Sent: 3/20/2019   3:30 PM  To: MARILYN Macias    Shena said Ross has been slurring his words since the DBS was turned on.  A couple days after it was turned on, the slurring became worse.  He doesn't have any tremors.

## 2019-04-17 ENCOUNTER — OFFICE VISIT (OUTPATIENT)
Dept: NEUROLOGY | Facility: CLINIC | Age: 79
End: 2019-04-17

## 2019-04-17 VITALS
WEIGHT: 300 LBS | SYSTOLIC BLOOD PRESSURE: 136 MMHG | HEIGHT: 70 IN | HEART RATE: 78 BPM | BODY MASS INDEX: 42.95 KG/M2 | DIASTOLIC BLOOD PRESSURE: 74 MMHG

## 2019-04-17 DIAGNOSIS — Z96.89 S/P DEEP BRAIN STIMULATOR PLACEMENT: ICD-10-CM

## 2019-04-17 DIAGNOSIS — G25.0 ESSENTIAL TREMOR: Primary | ICD-10-CM

## 2019-04-17 DIAGNOSIS — I67.9 CEREBROVASCULAR DISEASE: ICD-10-CM

## 2019-04-17 PROCEDURE — 99213 OFFICE O/P EST LOW 20 MIN: CPT | Performed by: PHYSICIAN ASSISTANT

## 2019-04-17 PROCEDURE — 95977 ALYS CPLX CN NPGT PRGRMG: CPT | Performed by: PHYSICIAN ASSISTANT

## 2019-05-05 PROBLEM — I67.9 CEREBROVASCULAR DISEASE: Status: ACTIVE | Noted: 2019-05-05

## 2019-05-05 NOTE — PROGRESS NOTES
Subjective   Ross Chandra is a 78 y.o. male is here today for follow-up.    DBS activation visit.  DBS placed for essential tremor, dominant on the right.  The patient is right-hand dominant.  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  Substantial past medical history also includes diabetes, atrial fibrillation, congestive heart failure, hypothyroidism, hyperlipidemia and vertebral artery stenosis.    Left thalamic lead placed 23 January 2019 without complications.  Generator and lead extension placed 6 February 2019 without complication.  DBS was placed by Dr. Jorge Luis Amos.  Neurostimulator model Activa SC-b.  Serial number [id number].  Model #[id number].  Firm where version 2.20.    The patient's tremor history is of progressive worsening tremor particularly since 2013.  This affects dominantly the right upper extremity which is his dominant hand with the left upper extremity being affected by his stroke.  Patient failed medical therapy for tremor and elected to have a deep brain stimulator placed.  Since surgery the patient has noticed no change in his tremor.  He has had no difficulty with wound healing or other unexpected consequences.  The patient notes that his tremor is worse with intention and interferes with all aspects of daily living.  The patient's gait is somewhat impaired secondary to his stroke residual.  The tremor reduces at rest and is not positively affected by any medications.  The tremor does not affect sleep.  Illness and other stress aggravates the tremor.  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  The patient is hoping to obtain more functional use of the right upper extremity with activation of his DBS unit.    Since last seen, patient has noticed some diminished effectiveness and increased tremor.  He has had some concern over slurred speech.         The following portions of the patient's  history were reviewed and updated as appropriate: allergies, current medications, past family history, past medical history, past social history, past surgical history and problem list.    Review of Systems   Constitutional: Positive for fatigue.   HENT: Negative.    Eyes: Negative.    Respiratory: Positive for shortness of breath.    Cardiovascular: Negative.    Gastrointestinal: Negative.    Endocrine: Negative.    Genitourinary: Negative.    Musculoskeletal: Positive for gait problem.   Skin: Negative.    Allergic/Immunologic: Negative.    Neurological: Positive for tremors, weakness and numbness. Negative for facial asymmetry and speech difficulty.   Hematological: Negative.    Psychiatric/Behavioral: Positive for sleep disturbance.       Objective   Physical Exam   Constitutional: He is oriented to person, place, and time.   HENT:   Head: Normocephalic.   Right Ear: Hearing and external ear normal.   Left Ear: Hearing and external ear normal.   Nose: Nose normal.   Mouth/Throat: Uvula is midline, oropharynx is clear and moist and mucous membranes are normal.   Left frontal cranial wound and other associated DBS placement wounds appear to be healing well and without complication.   Eyes: Conjunctivae, EOM and lids are normal. Pupils are equal, round, and reactive to light.   Neck: Trachea normal, normal range of motion and phonation normal. No JVD present. Carotid bruit is not present.   Cardiovascular: Normal rate, S1 normal and S2 normal. An irregular rhythm present.   Pulmonary/Chest: Effort normal and breath sounds normal.   Left anterior upper chest deep brain stimulator generator and lead are palpable.  His wound is well-healed.   Musculoskeletal:        Cervical back: He exhibits pain.        Lumbar back: He exhibits pain.   Neurological: He is alert and oriented to person, place, and time. He displays atrophy and tremor. No cranial nerve deficit or sensory deficit. He exhibits normal muscle tone. Gait  abnormal. Coordination normal. GCS eye subscore is 4. GCS verbal subscore is 5. GCS motor subscore is 6.   Reflex Scores:       Tricep reflexes are 1+ on the right side and 1+ on the left side.       Bicep reflexes are 1+ on the right side and 1+ on the left side.       Brachioradialis reflexes are 1+ on the right side and 1+ on the left side.       Patellar reflexes are 1+ on the right side and 1+ on the left side.       Achilles reflexes are 1+ on the right side and 1+ on the left side.  Left hemiparesis with residual functional but reduced strength on the left with left-sided hemiapraxia, worse in the upper extremity.    High-frequency, low to moderate amplitude tremor affecting the upper extremity on the right.  Tremor is much exacerbated with intention and effort.  No significant tremor is noted in the left upper extremity.    The patient is reexamined during and after multiple programming modes.  The patient's examination subsequent to activation and programming of the DBS unit shows much improved tremor on the right.  His neurologic exam showed no other changes than this improvement post programming.   Skin: Skin is warm, dry and intact.   Psychiatric: He has a normal mood and affect. His speech is normal and behavior is normal. Judgment and thought content normal. Cognition and memory are normal.   Nursing note and vitals reviewed.        Assessment/Plan   Ross was seen today for tremors.    Diagnoses and all orders for this visit:    Essential tremor    S/P deep brain stimulator placement    Cerebrovascular disease    No changes are recommended in his medication regimen today.  He remains on Plavix and Xarelto as well as Zocor for underlying disease states with a history of cerebrovascular disease.      10 minutes of a 15 minute outpatient visit was spent in counseling and coordination of care today.  This time was above and beyond that required for DBS evaluation and programming.    Battery level 3.01 V,  OK.    Left VIM voltage reprogrammed to 2.5 V.  Multiple different voltage parameters are trialed with the patient reexamined, we then settled on 2.5 V, pulse width 90 µs, rate 165 Hz.    The involved electrode impedance as well as the other electrodes show no problems (1222 ohms)           Dictated utilizing Dragon dictation.

## 2019-07-30 ENCOUNTER — OFFICE VISIT (OUTPATIENT)
Dept: NEUROLOGY | Facility: CLINIC | Age: 79
End: 2019-07-30

## 2019-07-30 VITALS
WEIGHT: 296 LBS | HEIGHT: 70 IN | SYSTOLIC BLOOD PRESSURE: 120 MMHG | BODY MASS INDEX: 42.37 KG/M2 | HEART RATE: 64 BPM | DIASTOLIC BLOOD PRESSURE: 80 MMHG

## 2019-07-30 DIAGNOSIS — Z96.89 S/P DEEP BRAIN STIMULATOR PLACEMENT: ICD-10-CM

## 2019-07-30 DIAGNOSIS — I63.411 CEREBROVASCULAR ACCIDENT (CVA) DUE TO EMBOLISM OF RIGHT MIDDLE CEREBRAL ARTERY (HCC): ICD-10-CM

## 2019-07-30 DIAGNOSIS — G81.94 LEFT HEMIPARESIS (HCC): ICD-10-CM

## 2019-07-30 DIAGNOSIS — G25.0 ESSENTIAL TREMOR: Primary | ICD-10-CM

## 2019-07-30 PROCEDURE — 95983 ALYS BRN NPGT PRGRMG 15 MIN: CPT | Performed by: PHYSICIAN ASSISTANT

## 2019-07-30 PROCEDURE — 95984 ALYS BRN NPGT PRGRMG ADDL 15: CPT | Performed by: PHYSICIAN ASSISTANT

## 2019-07-30 PROCEDURE — 99214 OFFICE O/P EST MOD 30 MIN: CPT | Performed by: PHYSICIAN ASSISTANT

## 2019-07-30 NOTE — PROGRESS NOTES
Aliyah Chandra is a 78 y.o. male is here today for follow-up.    DBS placed for essential tremor, dominant on the right.  The patient is right-hand dominant.  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  Substantial past medical history also includes diabetes, atrial fibrillation, congestive heart failure, hypothyroidism, hyperlipidemia and vertebral artery stenosis.    Left thalamic lead placed 23 January 2019 without complications.  Generator and lead extension placed 6 February 2019 without complication.  DBS was placed by Dr. Jorge Luis Amos MD, [facility].    The patient's tremor history is of progressive worsening tremor particularly since 2013.  This affects dominantly the right upper extremity which is his dominant hand with the left upper extremity being affected by his stroke.  Patient failed medical therapy for tremor and elected to have a deep brain stimulator placed.  Since surgery the patient has noticed no change in his tremor.  He has had no difficulty with wound healing or other unexpected consequences.  The patient notes that his tremor is worse with intention and interferes with all aspects of daily living.  The patient's gait is somewhat impaired secondary to his stroke residual.  The tremor reduces at rest and is not positively affected by any medications.  The tremor does not affect sleep.  Illness and other stress aggravates the tremor.  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     Since last seen, patient has noticed some diminished effectiveness and increased tremor.  Overall, he is substantially improved very happy with his DBS.         The following portions of the patient's history were reviewed and updated as appropriate: allergies, current medications, past family history, past medical history, past social history, past surgical history and problem  list.    Review of Systems   Constitutional: Positive for fatigue.   HENT: Negative.    Eyes: Negative.    Respiratory: Positive for shortness of breath.    Cardiovascular: Negative.    Gastrointestinal: Negative.    Endocrine: Negative.    Genitourinary: Negative.    Musculoskeletal: Positive for gait problem.   Skin: Negative.    Allergic/Immunologic: Negative.    Neurological: Positive for tremors, weakness and numbness. Negative for facial asymmetry and speech difficulty.   Hematological: Negative.    Psychiatric/Behavioral: Positive for sleep disturbance.       Objective   Physical Exam   Constitutional: He is oriented to person, place, and time.   HENT:   Head: Normocephalic.   Right Ear: External ear normal. Decreased hearing is noted.   Left Ear: External ear normal. Decreased hearing is noted.   Nose: Nose normal.   Mouth/Throat: Uvula is midline, oropharynx is clear and moist and mucous membranes are normal.   Left frontal cranial wound and other associated DBS placement wounds appear to be healing well and without complication.   Eyes: Conjunctivae, EOM and lids are normal. Pupils are equal, round, and reactive to light.   Neck: Trachea normal, normal range of motion and phonation normal. No JVD present. Carotid bruit is not present.   Cardiovascular: Normal rate. An irregular rhythm present.   Pulmonary/Chest: Effort normal and breath sounds normal.   Left anterior upper chest deep brain stimulator generator and lead are palpable.  His wound is well-healed.   Musculoskeletal:        Cervical back: He exhibits pain.        Lumbar back: He exhibits pain.   Neurological: He is alert and oriented to person, place, and time. He displays atrophy and tremor. A cranial nerve deficit is present. No sensory deficit. He exhibits normal muscle tone. Gait abnormal. Coordination normal. GCS eye subscore is 4. GCS verbal subscore is 5. GCS motor subscore is 6.   Reflex Scores:       Bicep reflexes are 1+ on the right  side and 1+ on the left side.       Brachioradialis reflexes are 1+ on the right side and 1+ on the left side.       Patellar reflexes are 1+ on the right side and 1+ on the left side.  Left hemiparesis with residual functional but reduced strength on the left with left-sided hemiapraxia, worse in the upper extremity.    High-frequency, low to moderate amplitude tremor affecting the upper extremity on the right.  Tremor is much exacerbated with intention and effort.  No significant tremor is noted in the left upper extremity.    The patient is reexamined during and after multiple programming modes.  The patient's examination subsequent to activation of the DBS unit shows much improved tremor on the right.   Skin: Skin is warm, dry and intact.   Psychiatric: He has a normal mood and affect. His speech is normal and behavior is normal. Thought content normal. Cognition and memory are normal. He expresses impulsivity.   Nursing note and vitals reviewed.        Assessment/Plan   Ross was seen today for tremors.    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  He does not have recurrent symptoms or increased neurologic deficit with regard to his stroke history.  Remains on Xarelto, Plavix and aspirin as well as atorvastatin 40 mg.  Reports that his glucose has been well controlled, blood pressure today is normal.  Today's findings with regard to his cerebrovascular disease are reviewed.    Expectations and utilization of deep brain stimulator unit to help control his essential tremor are reviewed with the patient.  The patient's tremor is significantly improved with deep brain stimulation, he continues to have some issues with the tremor and would like to try alternate programs with regard  to his DBS today.    Family and patient concerns and questions are reviewed in detail.  Signs or symptoms that require immediate neurologic follow-up with regard to his cerebrovascular issues and DBS are discussed.      20 minutes of a 25 minute outpatient visit was spent in counseling and coordination of care today.  This time was above and beyond that time required for evaluation and reprogramming of his DBS unit.        Neurostimulator: Activa SC-b  Serial Number: [id number] Implanted: 6 February 2019    Model Number: [id number]   Firmware Version: 2.20 Neurostimulator Location: Left chest    Battery Level: 2.97 V/ OK     Electrode Impedance: Impedance measured at 0.7 V, monopolar lead 0 1199 ohms, lead I 1371 ohms, lead 2 1172 ohms, lead 3 1515 ohms    Bipolar impedances ranged from 1515 ohms to 2300 ohms, within normal limits    Therapy Impedance: Left VIM program 1 impedance 1156 ohms, current 2.6 mA    Soft start/stop 4 seconds, cycling interval off    Stimulation Settings: Left VIM final settings, lead 0 NEGATIVE, 3.0 V, 90 µs, 165 Hz    30 minutes of face-to-face time was required for evaluation and reprogramming of his DBS unit.    Programming note multiple stimulation settings with evaluation of patient for effect.  Ranges included no stimulation through 3.4 V with serial physical examination for effect upon his tremor.  Final settings were not generating any adverse effects and provided more satisfactory relief of tremor.    Use of the home patient  was reviewed in detail with the patient and his wife.  The home patient  was operational with good battery.          Dictated utilizing Dragon dictation.

## 2019-08-20 ENCOUNTER — OFFICE VISIT (OUTPATIENT)
Dept: NEUROSURGERY | Facility: CLINIC | Age: 79
End: 2019-08-20

## 2019-08-20 VITALS
WEIGHT: 296 LBS | DIASTOLIC BLOOD PRESSURE: 78 MMHG | SYSTOLIC BLOOD PRESSURE: 138 MMHG | BODY MASS INDEX: 42.37 KG/M2 | HEIGHT: 70 IN

## 2019-08-20 DIAGNOSIS — G25.0 ESSENTIAL TREMOR: Primary | ICD-10-CM

## 2019-08-20 DIAGNOSIS — Z78.9 NON-TOBACCO USER: ICD-10-CM

## 2019-08-20 PROCEDURE — 99213 OFFICE O/P EST LOW 20 MIN: CPT | Performed by: NEUROLOGICAL SURGERY

## 2019-08-20 NOTE — PROGRESS NOTES
SUBJECTIVE:  Patient ID: Ross Chandra is a 78 y.o. male is here today for follow-up.    Chief Complaint: Tremor  Chief Complaint   Patient presents with   • Tremors     patient is here for a 6 month follow up; patient has had his DBS turned on and can tell a difference with it on.       HPI  78-year-old gentleman underwent a left DBS lead placement for right upper extremity tremor in February 201 19.  He is doing very well.  He is getting program by our neurology department.  His right upper extremity tremor is markedly improved.  He is able to eat and write with his right hand.  he says the last programming session he thinks may have affected his right leg.  He does not feel that it is quite working as well as it had been.    The following portions of the patient's history were reviewed and updated as appropriate: allergies, current medications, past family history, past medical history, past social history, past surgical history and problem list.    OBJECTIVE:    Review of Systems   Musculoskeletal: Positive for gait problem.   All other systems reviewed and are negative.         Physical Exam   Constitutional: He is oriented to person, place, and time.   Neurological: He is oriented to person, place, and time.   Psychiatric: His speech is normal.       Neurologic Exam     Mental Status   Oriented to person, place, and time.   Attention: normal.   Speech: speech is normal   Level of consciousness: alert  Knowledge: good.     Cranial Nerves   Cranial nerves II through XII intact.     Motor Exam   Muscle bulk: normal  Overall muscle tone: normal  Right arm pronator drift: absent  Left arm pronator drift: absent    Strength   Strength 5/5 except as noted. Generalized weakness in the lower extremities status post CVA     Sensory Exam   Light touch normal.   Pinprick normal.     Gait, Coordination, and Reflexes     Gait  Gait: (Slow moderately unsteady gait)    Tremor   Resting tremor: absent  Intention tremor:  absent  Action tremor: absent    Reflexes   Reflexes 2+ except as noted.       Independent Review of Radiographic Studies:       ASSESSMENT/PLAN:  The patient is done very well after his left DBS lead placement.  He has no tremor seen on exam today.  At this point I only need to see him on an as-needed basis.  He is can continue work with our neurology department with programming and  management.      1. Essential tremor    2. Non-tobacco user    3. BMI 40.0-44.9, adult (CMS/[facility])            Return if symptoms worsen or fail to improve.      Jorge Luis Amos MD

## 2019-08-20 NOTE — PATIENT INSTRUCTIONS
"PATIENT TO CONTINUE TO FOLLOW UP WITH HIS PRIMARY CARE PROVIDER FOR YEARLY PHYSICAL EXAMS TO ENSURE COMPLETE HEALTH MAINTENANCE        BMI for Adults    Body mass index (BMI) is a number that is calculated from a person's weight and height. BMI may help to estimate how much of a person's weight is composed of fat. BMI can help identify those who may be at higher risk for certain medical problems.  How is BMI used with adults?  BMI is used as a screening tool to identify possible weight problems. It is used to check whether a person is obese, overweight, healthy weight, or underweight.  How is BMI calculated?  BMI measures your weight and compares it to your height. This can be done either in English ([place] or metric measurements. Note that charts are available to help you find your BMI quickly and easily without having to do these calculations yourself.  To calculate your BMI in English ([place] measurements, your health care provider will:  1. Measure your weight in pounds (lb).  2. Multiply the number of pounds by 703.  ? For example, for a person who weighs 180 lb, multiply that number by 703, which equals 126,540.  3. Measure your height in inches (in). Then multiply that number by itself to get a measurement called \"inches squared.\"  ? For example, for a person who is 70 in tall, the \"inches squared\" measurement is 70 in x 70 in, which equals 4900 inches squared.  4. Divide the total from Step 2 (number of lb x 703) by the total from Step 3 (inches squared): 126,540 ÷ 4900 = 25.8. This is your BMI.  To calculate your BMI in metric measurements, your health care provider will:  1. Measure your weight in kilograms (kg).  2. Measure your height in meters (m). Then multiply that number by itself to get a measurement called \"meters squared.\"  ? For example, for a person who is 1.75 m tall, the \"meters squared\" measurement is 1.75 m x 1.75 m, which is equal to 3.1 meters squared.  3. Divide the number of kilograms " (your weight) by the meters squared number. In this example: 70 ÷ 3.1 = 22.6. This is your BMI.  How is BMI interpreted?  To interpret your results, your health care provider will use BMI charts to identify whether you are underweight, normal weight, overweight, or obese. The following guidelines will be used:  · Underweight: BMI less than 18.5.  · Normal weight: BMI between 18.5 and 24.9.  · Overweight: BMI between 25 and 29.9.  · Obese: BMI of 30 and above.  Please note:  · Weight includes both fat and muscle, so someone with a muscular build, such as an athlete, may have a BMI that is higher than 24.9. In cases like these, BMI is not an accurate measure of body fat.  · To determine if excess body fat is the cause of a BMI of 25 or higher, further assessments may need to be done by a health care provider.  · BMI is usually interpreted in the same way for men and women.  Why is BMI a useful tool?  BMI is useful in two ways:  · Identifying a weight problem that may be related to a medical condition, or that may increase the risk for medical problems.  · Promoting lifestyle and diet changes in order to reach a healthy weight.  Summary  · Body mass index (BMI) is a number that is calculated from a person's weight and height.  · BMI may help to estimate how much of a person's weight is composed of fat. BMI can help identify those who may be at higher risk for certain medical problems.  · BMI can be measured using English measurements or metric measurements.  · To interpret your results, your health care provider will use BMI charts to identify whether you are underweight, normal weight, overweight, or obese.  This information is not intended to replace advice given to you by your health care provider. Make sure you discuss any questions you have with your health care provider.  Document Released: 08/29/2005 Document Revised: 10/31/2018 Document Reviewed: 10/31/2018  [employer] Interactive Patient Education © 2019  [employer]

## 2020-01-31 ENCOUNTER — OFFICE VISIT (OUTPATIENT)
Dept: NEUROLOGY | Facility: CLINIC | Age: 80
End: 2020-01-31

## 2020-01-31 VITALS
HEART RATE: 66 BPM | HEIGHT: 70 IN | BODY MASS INDEX: 40.94 KG/M2 | SYSTOLIC BLOOD PRESSURE: 132 MMHG | OXYGEN SATURATION: 98 % | DIASTOLIC BLOOD PRESSURE: 80 MMHG | WEIGHT: 286 LBS

## 2020-01-31 DIAGNOSIS — Z96.89 S/P DEEP BRAIN STIMULATOR PLACEMENT: ICD-10-CM

## 2020-01-31 DIAGNOSIS — G25.0 ESSENTIAL TREMOR: Primary | ICD-10-CM

## 2020-01-31 PROCEDURE — 95983 ALYS BRN NPGT PRGRMG 15 MIN: CPT | Performed by: PHYSICIAN ASSISTANT

## 2020-01-31 PROCEDURE — 99213 OFFICE O/P EST LOW 20 MIN: CPT | Performed by: PHYSICIAN ASSISTANT

## 2020-08-27 ENCOUNTER — OFFICE VISIT (OUTPATIENT)
Dept: NEUROLOGY | Facility: CLINIC | Age: 80
End: 2020-08-27

## 2020-08-27 VITALS
SYSTOLIC BLOOD PRESSURE: 136 MMHG | WEIGHT: 272 LBS | DIASTOLIC BLOOD PRESSURE: 72 MMHG | HEIGHT: 70 IN | BODY MASS INDEX: 38.94 KG/M2 | OXYGEN SATURATION: 99 % | HEART RATE: 64 BPM

## 2020-08-27 DIAGNOSIS — I67.9 CEREBROVASCULAR DISEASE: ICD-10-CM

## 2020-08-27 DIAGNOSIS — G25.0 ESSENTIAL TREMOR: Primary | ICD-10-CM

## 2020-08-27 DIAGNOSIS — Z96.89 S/P DEEP BRAIN STIMULATOR PLACEMENT: ICD-10-CM

## 2020-08-27 DIAGNOSIS — G81.94 LEFT HEMIPARESIS (HCC): ICD-10-CM

## 2020-08-27 PROCEDURE — 95984 ALYS BRN NPGT PRGRMG ADDL 15: CPT | Performed by: PHYSICIAN ASSISTANT

## 2020-08-27 PROCEDURE — 95983 ALYS BRN NPGT PRGRMG 15 MIN: CPT | Performed by: PHYSICIAN ASSISTANT

## 2020-08-27 PROCEDURE — 99214 OFFICE O/P EST MOD 30 MIN: CPT | Performed by: PHYSICIAN ASSISTANT

## 2020-08-27 RX ORDER — DULAGLUTIDE 0.75 MG/.5ML
INJECTION, SOLUTION SUBCUTANEOUS WEEKLY
COMMUNITY
Start: 2020-08-10

## 2020-09-06 NOTE — PROGRESS NOTES
Subjective   Ross Chandra is a 79 y.o. male is here today for follow-up.    DBS placed for essential tremor, dominant on the right.  The patient is right-hand dominant.  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  Substantial past medical history also includes diabetes, atrial fibrillation, congestive heart failure, hypothyroidism, hyperlipidemia and vertebral artery stenosis.    Left thalamic lead placed 23 January 2019 without complications.  Generator and lead extension placed 6 February 2019 without complication.  DBS was placed by Dr. Jorge Luis Amos MD, [facility].    The patient's tremor history is of progressive worsening tremor particularly since 2013.  This affects dominantly the right upper extremity which is his dominant hand with the left upper extremity being affected by his stroke.  Patient failed medical therapy for tremor and elected to have a deep brain stimulator placed.  Since surgery the patient has noticed no change in his tremor.  He has had no difficulty with wound healing or other unexpected consequences.  The patient notes that his tremor is worse with intention and interferes with all aspects of daily living.  The patient's gait is somewhat impaired secondary to his stroke residual.  The tremor reduces at rest and is not positively affected by any medications.  The tremor does not affect sleep.  Illness and other stress aggravates the tremor.  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     Since last seen, patient has noticed some diminished effectiveness and increased tremor.  He has had substantial increasing difficulty using his computer and occasionally with eating.  Overall, he is substantially improved very happy with his DBS.       The following portions of the patient's history were reviewed and updated as appropriate: allergies, current medications, past family  history, past medical history, past social history, past surgical history and problem list.    Review of Systems   Constitutional: Positive for fatigue.   HENT: Negative.    Eyes: Negative.    Respiratory: Positive for shortness of breath.    Cardiovascular: Negative.    Gastrointestinal: Negative.    Endocrine: Negative.    Genitourinary: Negative.    Musculoskeletal: Positive for gait problem.   Skin: Negative.    Allergic/Immunologic: Negative.    Neurological: Positive for tremors, weakness and numbness. Negative for facial asymmetry and speech difficulty.   Hematological: Negative.    Psychiatric/Behavioral: Positive for sleep disturbance.         Current Outpatient Medications:   •  atorvastatin (LIPITOR) 40 MG tablet, Take 40 mg by mouth Daily., Disp: , Rfl:   •  clopidogrel (PLAVIX) 75 MG tablet, Take 75 mg by mouth Daily., Disp: , Rfl:   •  dorzolamide-timolol (COSOPT) 22.3-6.8 MG/ML ophthalmic solution, Administer 1 drop into the left eye 2 (Two) Times a Day., Disp: , Rfl:   •  escitalopram (LEXAPRO) 20 MG tablet, Take 10 mg by mouth Daily., Disp: , Rfl:   •  fenofibrate micronized (LOFIBRA) 134 MG capsule, Take 134 mg by mouth Every Morning Before Breakfast., Disp: , Rfl:   •  furosemide (LASIX) 20 MG tablet, Take 20 mg by mouth Daily., Disp: , Rfl:   •  levothyroxine (SYNTHROID, LEVOTHROID) 50 MCG tablet, Take 50 mcg by mouth Daily., Disp: , Rfl:   •  lisinopril (PRINIVIL,ZESTRIL) 5 MG tablet, Take 5 mg by mouth Daily., Disp: , Rfl:   •  pantoprazole (PROTONIX) 40 MG EC tablet, Take 40 mg by mouth Daily., Disp: , Rfl:   •  potassium chloride (K-DUR,KLOR-CON) 20 MEQ CR tablet, Take 20 mEq by mouth 2 (Two) Times a Day., Disp: , Rfl:   •  rivaroxaban (XARELTO) 15 MG tablet, Take 15 mg by mouth Daily With Dinner., Disp: , Rfl:   •  rOPINIRole (REQUIP) 0.25 MG tablet, Take 0.25 mg by mouth Every Night. Take 2 tabs at bedtime, Disp: , Rfl:   •  simvastatin (ZOCOR) 40 MG tablet, Take 40 mg by mouth Every  Night., Disp: , Rfl:   •  TRULICITY 0.75 MG/0.5ML solution pen-injector, 1 (One) Time Per Week., Disp: , Rfl:      Objective   Physical Exam   Constitutional: He is oriented to person, place, and time.   HENT:   Head: Normocephalic.   Right Ear: External ear normal. Decreased hearing is noted.   Left Ear: External ear normal. Decreased hearing is noted.   Nose: Nose normal.   Mouth/Throat: Uvula is midline, oropharynx is clear and moist and mucous membranes are normal.   Left frontal cranial wound and other associated DBS placement wounds appear to be healing well and without complication.   Eyes: Pupils are equal, round, and reactive to light. Conjunctivae, EOM and lids are normal.   Neck: Trachea normal, normal range of motion and phonation normal. No JVD present. Carotid bruit is not present.   Cardiovascular: Normal rate. An irregular rhythm present.   Pulmonary/Chest: Effort normal and breath sounds normal.   Left anterior upper chest deep brain stimulator generator and lead are palpable.  His wound is well-healed.   Musculoskeletal:        Cervical back: He exhibits pain.        Lumbar back: He exhibits pain.   Neurological: He is alert and oriented to person, place, and time. He displays atrophy and tremor. A cranial nerve deficit is present. No sensory deficit. He exhibits normal muscle tone. Gait abnormal. Coordination normal. GCS eye subscore is 4. GCS verbal subscore is 5. GCS motor subscore is 6.   Reflex Scores:       Bicep reflexes are 1+ on the right side and 1+ on the left side.       Brachioradialis reflexes are 1+ on the right side and 1+ on the left side.       Patellar reflexes are 1+ on the right side and 1+ on the left side.  Left hemiparesis with residual functional but reduced strength on the left with left-sided hemiapraxia, worse in the upper extremity.    High-frequency, low to moderate amplitude tremor affecting the upper extremity on the right.  Tremor is much exacerbated with intention  and effort.  No significant tremor is noted in the left upper extremity.    The patient is reexamined during and after multiple programming modes.  The patient's examination subsequent to activation of the DBS unit shows much improved tremor on the right.   Skin: Skin is warm, dry and intact.   Psychiatric: He has a normal mood and affect. His speech is normal and behavior is normal. Thought content normal. Cognition and memory are normal. He expresses impulsivity.   Nursing note and vitals reviewed.        Assessment/Plan   Ross was seen today for tremors.    Diagnoses and all orders for this visit:    Essential tremor    S/P deep brain stimulator placement    Left hemiparesis (CMS/HCC)    Cerebrovascular disease    The patient's worsening symptoms are attributable to tremor rather than progression of his cerebrovascular issues.  The patient will continue on antiplatelet and lipid-lowering agents.  The patient is also on Xarelto.  Medication recommendations, expectations, directions and questions from the patient and his wife are reviewed in detail.    Neurostimulator: Activa SC-b                        Serial Number: [id number]    Implanted: 6 February 2019     Model Number: [id number]                                    Firmware Version: 2.20          Neurostimulator Location: Left chest     Battery Level: 2.91 V/ OK            Electrode Impedance: Impedance measured at 0.7 V, monopolar contact 0 795 ohms, contact I 804 ohms, contact 2 923 ohms, contact 3 1122 ohms     Bipolar impedances within normal limits     Therapy Impedance: NA     Soft start/stop 4 seconds, cycling interval off     Final Stimulation Settings: Left VIM final settings, contact 0 NEGATIVE, contact 1 POSITIVE, 3.5 V, 150 µs, 120 Hz    Multiple contact stimulation patterns and intensities are adjusted to serial physical examination in order to find a simple pattern which reduces his tremor.  Functional use of a computer mouse and hand to mouth  coordination are evaluated along with regular neurologic findings multiple times.  Proximal contact configurations led to more pronounced dysarthria and facial weakness with sensory complaints of numbness and tingling in the face and right upper extremity.  Satisfactory stimulation pattern with improved function was noted at the end of the session.     45 minutes was spent in evaluation and programming of the DBS unit today.           Dictated utilizing Dragon dictation.

## 2020-11-18 ENCOUNTER — TELEPHONE (OUTPATIENT)
Dept: NEUROLOGY | Facility: CLINIC | Age: 80
End: 2020-11-18

## 2020-11-18 NOTE — TELEPHONE ENCOUNTER
Shena notified Sourav said he will see Ross before March.  Ashwin is out of the office today, I sent her a message requesting his appointment be moved up.

## 2020-11-18 NOTE — TELEPHONE ENCOUNTER
PT'S SPOUSE MATTHEW CALLED IN TODAY REQUESTING AN EARLIER APPT FOR THE PT THAN THE ONE JOSUÉ IN MARCH. SHE STATES HIS HANDS HAVE STARTED SHAKING MORE AND SHE HAS NOTICED IT OVER THE LAST COUPLE OF WEEKS . SHE STATES HE IS NOT EXPERIENCING ANY OTHER SYMPTOMS AND HE IS STILL TAKING HIS MEDICATION AS PRESCRIBED. PLEASE REVIEW AND ADVISE        CALL BACK- [phone]

## 2020-12-02 ENCOUNTER — TELEPHONE (OUTPATIENT)
Dept: NEUROLOGY | Facility: CLINIC | Age: 80
End: 2020-12-02

## 2020-12-02 NOTE — TELEPHONE ENCOUNTER
----- Message from Karli Madrigal MA sent at 12/2/2020  9:21 AM CST -----  Tuesday, Dec 8th at 10am?  ----- Message -----  From: Gabi Mendez LPN  Sent: 12/2/2020   8:59 AM CST  To: Karli Madrigal MA    Needs his appointment moved up.  ----- Message -----  From: Que Cehry PA  Sent: 11/18/2020   1:19 PM CST  To: Karli Madrigal MA, Gabi Mendez LPN    Sure - not emergent but wherever Ashwin can put him    ----- Message -----  From: Gabi Mendez LPN  Sent: 11/18/2020  12:53 PM CST  To: MARILYN Macias    Received a message from the [facility], Ross is have tremors in his hands.  Do you want to see him soon and check the DBS?

## 2020-12-08 ENCOUNTER — OFFICE VISIT (OUTPATIENT)
Dept: NEUROLOGY | Facility: CLINIC | Age: 80
End: 2020-12-08

## 2020-12-08 VITALS
SYSTOLIC BLOOD PRESSURE: 140 MMHG | HEART RATE: 59 BPM | OXYGEN SATURATION: 98 % | HEIGHT: 70 IN | DIASTOLIC BLOOD PRESSURE: 70 MMHG | WEIGHT: 280 LBS | BODY MASS INDEX: 40.09 KG/M2

## 2020-12-08 DIAGNOSIS — Z96.89 S/P DEEP BRAIN STIMULATOR PLACEMENT: ICD-10-CM

## 2020-12-08 DIAGNOSIS — G25.0 ESSENTIAL TREMOR: Primary | ICD-10-CM

## 2020-12-08 DIAGNOSIS — G81.94 LEFT HEMIPARESIS (HCC): ICD-10-CM

## 2020-12-08 DIAGNOSIS — I63.81 MULTIPLE LACUNAR INFARCTS (HCC): ICD-10-CM

## 2020-12-08 DIAGNOSIS — I63.432 CEREBRAL INFARCTION DUE TO EMBOLISM OF LEFT POSTERIOR CEREBRAL ARTERY (HCC): ICD-10-CM

## 2020-12-08 PROCEDURE — 95984 ALYS BRN NPGT PRGRMG ADDL 15: CPT | Performed by: PHYSICIAN ASSISTANT

## 2020-12-08 PROCEDURE — 99214 OFFICE O/P EST MOD 30 MIN: CPT | Performed by: PHYSICIAN ASSISTANT

## 2020-12-08 PROCEDURE — 95983 ALYS BRN NPGT PRGRMG 15 MIN: CPT | Performed by: PHYSICIAN ASSISTANT

## 2020-12-08 NOTE — PROGRESS NOTES
Subjective   Ross Chandra is a 80 y.o. male is here today for follow-up.    DBS placed for essential tremor, dominant on the right.  The patient is right-hand dominant.  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  Substantial past medical history also includes diabetes, atrial fibrillation, congestive heart failure, hypothyroidism, hyperlipidemia and vertebral artery stenosis.    Left thalamic lead placed 23 January 2019 without complications.  Generator and lead extension placed 6 February 2019 without complication.  DBS was placed by Dr. Jorge Luis Amos MD, [facility].    The patient's tremor history is of progressive worsening tremor particularly since 2013.  This affects dominantly the right upper extremity which is his dominant hand with the left upper extremity being affected by his stroke.  Patient failed medical therapy for tremor and elected to have a deep brain stimulator placed.  Since surgery the patient has noticed no change in his tremor.  He has had no difficulty with wound healing or other unexpected consequences.  The patient notes that his tremor is worse with intention and interferes with all aspects of daily living.  The patient's gait is somewhat impaired secondary to his stroke residual.  The tremor reduces at rest and is not positively affected by any medications.  The tremor does not affect sleep.  Illness and other stress aggravates the tremor.  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     Since last seen, patient has once again noticed some diminished effectiveness and increased tremor.  He has had substantial increasing difficulty using his computer and occasionally with eating.  Overall, he is substantially improved very happy with his DBS.    Stroke  This is a chronic problem. The current episode started more than 1 year ago. The problem occurs constantly. The problem has  been unchanged. Associated symptoms include arthralgias, fatigue and weakness. The symptoms are aggravated by exertion and stress. He has tried rest and position changes (Anticoagulation, antiplatelet, lipid lowering, glucose and HTN control) for the symptoms. The treatment provided moderate relief.        The following portions of the patient's history were reviewed and updated as appropriate: allergies, current medications, past family history, past medical history, past social history, past surgical history and problem list.    Review of Systems   Constitutional: Positive for activity change and fatigue.   HENT: Negative.    Eyes: Positive for visual disturbance.   Respiratory: Positive for shortness of breath.    Cardiovascular: Negative.    Gastrointestinal: Negative.    Endocrine: Negative.    Genitourinary: Negative.    Musculoskeletal: Positive for arthralgias and gait problem.   Skin: Negative.    Allergic/Immunologic: Negative.    Neurological: Positive for tremors and weakness.   Hematological: Negative.    Psychiatric/Behavioral: Positive for dysphoric mood and sleep disturbance. The patient is nervous/anxious.          Current Outpatient Medications:   •  atorvastatin (LIPITOR) 40 MG tablet, Take 40 mg by mouth Daily., Disp: , Rfl:   •  clopidogrel (PLAVIX) 75 MG tablet, Take 75 mg by mouth Daily., Disp: , Rfl:   •  dorzolamide-timolol (COSOPT) 22.3-6.8 MG/ML ophthalmic solution, Administer 1 drop into the left eye 2 (Two) Times a Day., Disp: , Rfl:   •  escitalopram (LEXAPRO) 20 MG tablet, Take 10 mg by mouth Daily., Disp: , Rfl:   •  fenofibrate micronized (LOFIBRA) 134 MG capsule, Take 134 mg by mouth Every Morning Before Breakfast., Disp: , Rfl:   •  furosemide (LASIX) 20 MG tablet, Take 20 mg by mouth Daily., Disp: , Rfl:   •  levothyroxine (SYNTHROID, LEVOTHROID) 50 MCG tablet, Take 50 mcg by mouth Daily., Disp: , Rfl:   •  lisinopril (PRINIVIL,ZESTRIL) 5 MG tablet, Take 5 mg by mouth Daily.,  Disp: , Rfl:   •  pantoprazole (PROTONIX) 40 MG EC tablet, Take 40 mg by mouth Daily., Disp: , Rfl:   •  potassium chloride (K-DUR,KLOR-CON) 20 MEQ CR tablet, Take 20 mEq by mouth 2 (Two) Times a Day., Disp: , Rfl:   •  rivaroxaban (XARELTO) 15 MG tablet, Take 15 mg by mouth Daily With Dinner., Disp: , Rfl:   •  rOPINIRole (REQUIP) 0.25 MG tablet, Take 0.25 mg by mouth Every Night. Take 2 tabs at bedtime, Disp: , Rfl:   •  simvastatin (ZOCOR) 40 MG tablet, Take 40 mg by mouth Every Night., Disp: , Rfl:   •  TRULICITY 0.75 MG/0.5ML solution pen-injector, 1 (One) Time Per Week., Disp: , Rfl:      Objective   Physical Exam  Vitals signs and nursing note reviewed.   HENT:      Head: Normocephalic.      Right Ear: External ear normal.      Left Ear: External ear normal.      Nose: Nose normal.   Eyes:      General: Lids are normal. Vision grossly intact. Gaze aligned appropriately. No scleral icterus.     Extraocular Movements: Extraocular movements intact.      Conjunctiva/sclera: Conjunctivae normal.      Pupils: Pupils are equal, round, and reactive to light.   Neck:      Musculoskeletal: Normal range of motion.      Vascular: No JVD.      Trachea: Trachea and phonation normal.   Cardiovascular:      Rate and Rhythm: Normal rate.      Heart sounds: Normal heart sounds.   Pulmonary:      Effort: Pulmonary effort is normal.      Breath sounds: Normal breath sounds.   Skin:     General: Skin is warm and dry.   Neurological:      Mental Status: He is alert and oriented to person, place, and time.      GCS: GCS eye subscore is 4. GCS verbal subscore is 5. GCS motor subscore is 6.      Cranial Nerves: Cranial nerves are intact.      Sensory: Sensory deficit present.      Motor: Weakness and tremor present.      Coordination: Heel to Shin Test abnormal. Impaired rapid alternating movements.      Gait: Gait abnormal.      Deep Tendon Reflexes:      Reflex Scores:       Bicep reflexes are 1+ on the right side and 1+ on the  left side.       Brachioradialis reflexes are 1+ on the right side and 1+ on the left side.       Patellar reflexes are 1+ on the right side and 1+ on the left side.     Comments: Left hemiparesis with residual functional but reduced strength on the left with left-sided hemiapraxia, worse in the upper extremity.    High-frequency, low to moderate amplitude tremor affecting the upper extremity on the right.  Tremor is much exacerbated with intention and effort.  No significant tremor is noted in the left upper extremity.    The patient is reexamined during and after multiple programming modes.  The patient's examination subsequent to adjustment of the DBS unit shows much improved tremor on the right.    Psychiatric:         Attention and Perception: Attention and perception normal.         Mood and Affect: Mood and affect normal.         Speech: Speech is delayed.         Behavior: Behavior normal.         Thought Content: Thought content normal.         Cognition and Memory: Cognition and memory normal.         Judgment: Judgment is impulsive.           Assessment/Plan   Diagnoses and all orders for this visit:    1. Essential tremor (Primary)    2. S/P deep brain stimulator placement    3. Multiple lacunar infarcts (CMS/HCC)    4. 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 Left hemiparesis (CMS/HCC)    We have adjusted his deep brain stimulator today.  The patient tolerated this adjustment well and reduced tremor.  Several settings were attempted in order to reduce demand on his unit, the patient remains on bipolar, 4 V with a battery reserve of 2.89 V; this leads me concerned that his battery may soon need to be replaced.  I discussed this with the patient and told him that it is certain the unit will need to be replaced in 2021.  I have contacted representatives for [employer] (the unit ) and discussed these findings, unit settings, impedances in order to  obtain a more precise estimate of time prior to ROSA M based on continuous use.  The patient required 30 minutes of evaluation and adjustment for his deep brain stimulator today.    The patient is stable from a cerebrovascular disease standpoint.  The patient has had long-term anticoagulation and antiplatelet therapy.  The patient continues on lipid-lowering agents.  Importance of control of his blood pressure and glucose are reviewed with the patient.  He is encouraged to continue following with his primary care doctor.  The risk of recurrent stroke and signs or symptoms of recurrent vascular issues are reviewed with the patient in detail today.                                                     Dictated utilizing Dragon dictation.

## 2021-03-01 ENCOUNTER — OFFICE VISIT (OUTPATIENT)
Dept: NEUROLOGY | Facility: CLINIC | Age: 81
End: 2021-03-01

## 2021-03-01 VITALS
WEIGHT: 280 LBS | HEIGHT: 70 IN | SYSTOLIC BLOOD PRESSURE: 126 MMHG | DIASTOLIC BLOOD PRESSURE: 72 MMHG | HEART RATE: 60 BPM | BODY MASS INDEX: 40.09 KG/M2 | OXYGEN SATURATION: 98 %

## 2021-03-01 DIAGNOSIS — Z96.89 S/P DEEP BRAIN STIMULATOR PLACEMENT: ICD-10-CM

## 2021-03-01 DIAGNOSIS — G25.0 ESSENTIAL TREMOR: Primary | ICD-10-CM

## 2021-03-01 DIAGNOSIS — I63.432 CEREBRAL INFARCTION DUE TO EMBOLISM OF LEFT POSTERIOR CEREBRAL ARTERY (HCC): ICD-10-CM

## 2021-03-01 DIAGNOSIS — G81.94 LEFT HEMIPARESIS (HCC): ICD-10-CM

## 2021-03-01 PROCEDURE — 95984 ALYS BRN NPGT PRGRMG ADDL 15: CPT | Performed by: PHYSICIAN ASSISTANT

## 2021-03-01 PROCEDURE — 95983 ALYS BRN NPGT PRGRMG 15 MIN: CPT | Performed by: PHYSICIAN ASSISTANT

## 2021-03-01 PROCEDURE — 99213 OFFICE O/P EST LOW 20 MIN: CPT | Performed by: PHYSICIAN ASSISTANT

## 2021-03-17 ENCOUNTER — TELEPHONE (OUTPATIENT)
Dept: NEUROLOGY | Facility: CLINIC | Age: 81
End: 2021-03-17

## 2021-03-17 NOTE — TELEPHONE ENCOUNTER
Provider: MARILYN JONES  Caller: MATTHEW BAILEY  Relationship to Patient: SPOUSE  Pharmacy: [facility] IN [place], [place].  Phone Number: [phone]; PLEASE LVM IF UNABLE TO REACH.  Reason for Call: PT FELL ON 3/9/21 AND FRACTURED HIS HIP; PT IS NOT IN A REHABILITION CENTER AFTER FALL. PT'S WIFE AND REHAB STAFF ARE HAVING A VERY HARD TIME UNDERSTANDING PT DUE TO SLURRED SPEECH. SHE IS WONDERING IF PT'S DEEP BRAIN STIMULATION COULD BE CAUSING THE SPEECH ISSUE. SHE STATES SHE HAS READ SOME ONLINE THAT THIS COULD BE A POSSIBILITY BUT WOULD LIKE PROVIDER'S OPINION.  When was the patient last seen: 3/1/21      PLEASE REVIEW AND ADVISE.

## 2021-03-18 NOTE — TELEPHONE ENCOUNTER
Shena said Ross is better today.  He has speech therapy.  She is to call if he continues to have problems.  She thinks he will be able to come to his appointment next month.

## 2021-03-19 NOTE — PROGRESS NOTES
Subjective   Ross Chandra is a 80 y.o. male is here today for follow-up.    DBS placed for essential tremor, dominant on the right.  The patient is right-hand dominant.  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  Substantial past medical history also includes diabetes, atrial fibrillation, congestive heart failure, hypothyroidism, hyperlipidemia and vertebral artery stenosis.    Left thalamic lead placed 23 January 2019 without complications.  Generator and lead extension placed 6 February 2019 without complication.  DBS was placed by Dr. Jorge Luis Amos MD, [facility].    The patient's tremor history is of progressive worsening tremor particularly since 2013.  This affects dominantly the right upper extremity which is his dominant hand with the left upper extremity being affected by his stroke.  Patient failed medical therapy for tremor and elected to have a deep brain stimulator placed.  Since surgery the patient has noticed no change in his tremor.  He has had no difficulty with wound healing or other unexpected consequences.  The patient notes that his tremor is worse with intention and interferes with all aspects of daily living.  The patient's gait is somewhat impaired secondary to his stroke residual.  The tremor reduces at rest and is not positively affected by any medications.  The tremor does not affect sleep.  Illness and other stress aggravates the tremor.  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     Since last seen, patient has noticed some diminished effectiveness and increased tremor.  He has had substantial increasing difficulty using his computer and occasionally with eating.  Overall, he is substantially improved and remains very happy with his DBS.    Stroke  This is a chronic problem. The current episode started more than 1 year ago. The problem occurs constantly. The problem has  been unchanged. Associated symptoms include arthralgias, fatigue and weakness. The symptoms are aggravated by exertion and stress. He has tried rest and position changes (Anticoagulation, antiplatelet, lipid lowering, glucose and HTN control) for the symptoms. The treatment provided moderate relief.        The following portions of the patient's history were reviewed and updated as appropriate: allergies, current medications, past family history, past medical history, past social history, past surgical history and problem list.    Review of Systems   Constitutional: Positive for activity change and fatigue.   HENT: Negative.    Eyes: Positive for visual disturbance.   Respiratory: Positive for shortness of breath.    Cardiovascular: Negative.    Gastrointestinal: Negative.    Endocrine: Negative.    Genitourinary: Negative.    Musculoskeletal: Positive for arthralgias and gait problem.   Skin: Negative.    Allergic/Immunologic: Negative.    Neurological: Positive for tremors and weakness.   Hematological: Negative.    Psychiatric/Behavioral: Positive for dysphoric mood and sleep disturbance. The patient is nervous/anxious.          Current Outpatient Medications:   •  atorvastatin (LIPITOR) 40 MG tablet, Take 40 mg by mouth Daily., Disp: , Rfl:   •  clopidogrel (PLAVIX) 75 MG tablet, Take 75 mg by mouth Daily., Disp: , Rfl:   •  dorzolamide-timolol (COSOPT) 22.3-6.8 MG/ML ophthalmic solution, Administer 1 drop into the left eye 2 (Two) Times a Day., Disp: , Rfl:   •  escitalopram (LEXAPRO) 20 MG tablet, Take 10 mg by mouth Daily., Disp: , Rfl:   •  fenofibrate micronized (LOFIBRA) 134 MG capsule, Take 134 mg by mouth Every Morning Before Breakfast., Disp: , Rfl:   •  furosemide (LASIX) 20 MG tablet, Take 20 mg by mouth Daily., Disp: , Rfl:   •  levothyroxine (SYNTHROID, LEVOTHROID) 50 MCG tablet, Take 50 mcg by mouth Daily., Disp: , Rfl:   •  lisinopril (PRINIVIL,ZESTRIL) 5 MG tablet, Take 5 mg by mouth Daily.,  Disp: , Rfl:   •  pantoprazole (PROTONIX) 40 MG EC tablet, Take 40 mg by mouth Daily., Disp: , Rfl:   •  potassium chloride (K-DUR,KLOR-CON) 20 MEQ CR tablet, Take 20 mEq by mouth 2 (Two) Times a Day., Disp: , Rfl:   •  rivaroxaban (XARELTO) 15 MG tablet, Take 15 mg by mouth Daily With Dinner., Disp: , Rfl:   •  rOPINIRole (REQUIP) 0.25 MG tablet, Take 0.25 mg by mouth Every Night. Take 2 tabs at bedtime, Disp: , Rfl:   •  simvastatin (ZOCOR) 40 MG tablet, Take 40 mg by mouth Every Night., Disp: , Rfl:   •  TRULICITY 0.75 MG/0.5ML solution pen-injector, 1 (One) Time Per Week., Disp: , Rfl:      Objective   Physical Exam  Vitals and nursing note reviewed.   HENT:      Head: Normocephalic.      Right Ear: External ear normal.      Left Ear: External ear normal.      Nose: Nose normal.   Eyes:      General: Lids are normal. Vision grossly intact. Gaze aligned appropriately. No scleral icterus.     Extraocular Movements: Extraocular movements intact.      Conjunctiva/sclera: Conjunctivae normal.      Pupils: Pupils are equal, round, and reactive to light.   Neck:      Vascular: No JVD.      Trachea: Trachea and phonation normal.   Cardiovascular:      Rate and Rhythm: Normal rate.      Heart sounds: Normal heart sounds.   Pulmonary:      Effort: Pulmonary effort is normal.      Breath sounds: Normal breath sounds.   Musculoskeletal:      Cervical back: Normal range of motion.   Skin:     General: Skin is warm and dry.   Neurological:      Mental Status: He is alert and oriented to person, place, and time.      GCS: GCS eye subscore is 4. GCS verbal subscore is 5. GCS motor subscore is 6.      Cranial Nerves: Cranial nerves are intact.      Sensory: Sensory deficit present.      Motor: Weakness and tremor present.      Coordination: Heel to Shin Test abnormal. Impaired rapid alternating movements.      Gait: Gait abnormal.      Deep Tendon Reflexes:      Reflex Scores:       Bicep reflexes are 1+ on the right side and 1+  on the left side.       Brachioradialis reflexes are 1+ on the right side and 1+ on the left side.       Patellar reflexes are 1+ on the right side and 1+ on the left side.     Comments: Left hemiparesis with residual functional but reduced strength on the left with left-sided hemiapraxia, worse in the upper extremity.    High-frequency, low to moderate amplitude tremor affecting the upper extremity on the right.  Tremor is much exacerbated with intention and effort.  No significant tremor is noted in the left upper extremity.    The patient is reexamined during and after multiple programming modes.  The patient's examination subsequent to adjustment of the DBS unit shows much improved tremor on the right.    Psychiatric:         Attention and Perception: Attention and perception normal.         Mood and Affect: Mood and affect normal.         Speech: Speech is delayed.         Behavior: Behavior normal.         Thought Content: Thought content normal.         Cognition and Memory: Cognition and memory normal.         Judgment: Judgment is impulsive.           Assessment/Plan   Diagnoses and all orders for this visit:    1. Essential tremor (Primary)    2. S/P deep brain stimulator placement    3. 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 Left hemiparesis (CMS/HCC)    Increased issues with tremor necessitating some adjustment of his DBS unit.    Discussed issues with regard to his previous stroke including continuation of Lipitor and Plavix.  He is also on Xarelto.    Signs or symptoms of new stroke are reviewed with the patient.    30 minutes were spent programming his DBS unit today.                                 Dictated utilizing Dragon dictation.

## 2021-04-05 ENCOUNTER — TELEPHONE (OUTPATIENT)
Dept: NEUROSURGERY | Facility: CLINIC | Age: 81
End: 2021-04-05

## 2021-04-05 NOTE — TELEPHONE ENCOUNTER
This looks like it may should have gone to neurology, patient has an appointment with [facility] on 04/14/2021.  But he has seen Dr. Amos in 2019, but I looked in the chart I'm not seeing a neurosurgery referral in the chart.

## 2021-04-05 NOTE — TELEPHONE ENCOUNTER
Name of patient: BRITTNEY BAILEY    New or established patient?  [] New  [x] Established    Date of discharge: 4/1/21    Facility discharged from: [facility]    Diagnosis/Symptoms: PATIENT FELL    Length of stay (If applicable): 3/15/21 TO 4/1/21    [facility] FAXED IN ALL DOCUMENTS CONCERNING PATIENTS STAY:  ALL DOCUMENTS ARE NOW IN CHART.

## (undated) DEVICE — SUT VIC 3/0 CT1 CR8 18IN VCP738D

## (undated) DEVICE — LP VESL MAXI RED 2PK

## (undated) DEVICE — PK CRANI 30

## (undated) DEVICE — PK SPINE CERV ANT 30

## (undated) DEVICE — SUT SILK 3/0 SUTUPAK TIES 24IN SA74H

## (undated) DEVICE — UTILITY MARKER W/MED LABELS: Brand: MEDLINE

## (undated) DEVICE — PK TURNOVER RM ADV

## (undated) DEVICE — PK EXTRM 30

## (undated) DEVICE — PACK,SHOULDER,DRAPE,POUCH: Brand: MEDLINE

## (undated) DEVICE — GLV SURG TRIUMPH GREEN W/ALOE PF LTX 8 STRL

## (undated) DEVICE — 3.0MM PRECISION NEURO (MATCH HEAD)

## (undated) DEVICE — SUT SILK 4/0 SUTUPAK TIES 24IN SA73H

## (undated) DEVICE — BANDAGE,GAUZE,BULKEE II,4.5"X4.1YD,STRL: Brand: MEDLINE

## (undated) DEVICE — SET TB ELECTRD SGL INSRT STRL

## (undated) DEVICE — CATHETER,FOLEY,TEMP SENS,16FR 10ML,LF: Brand: MEDLINE

## (undated) DEVICE — ELECTRD MIC PLTFRM DZAP STRL

## (undated) DEVICE — TRY PREP SCRB VAG PVP

## (undated) DEVICE — COVER,MAYO STAND,STERILE: Brand: MEDLINE

## (undated) DEVICE — TOTAL TRAY, 16FR 10ML SIL FOLEY, URN: Brand: MEDLINE

## (undated) DEVICE — Device

## (undated) DEVICE — DISPOSABLE IRRIGATION BIPOLAR CORD, M1000 TYPE: Brand: KIRWAN

## (undated) DEVICE — SURGICAL SUCTION CONNECTING TUBE WITH MALE CONNECTOR AND SUCTION CLAMP, 2 FT. LONG (.6 M), 5 MM I.D.: Brand: CONMED

## (undated) DEVICE — 3M™ WARMING BLANKET, LOWER BODY, 10 PER CASE, 42568: Brand: BAIR HUGGER™

## (undated) DEVICE — SUT ETHIB 3/0 SH DA 36IN X522H

## (undated) DEVICE — SYR SLP TP 10ML DISP

## (undated) DEVICE — PROTECTOR ULNA NERV

## (undated) DEVICE — GLV SURG BIOGEL LTX PF 6 1/2

## (undated) DEVICE — MICRO KOVER: Brand: UNBRANDED

## (undated) DEVICE — SUT NUROLON 4/0 TF18 CR8 I8IN C584D

## (undated) DEVICE — LP VESL MINI BLU PK/2

## (undated) DEVICE — ANTIBACTERIAL UNDYED BRAIDED (POLYGLACTIN 910), SYNTHETIC ABSORBABLE SUTURE: Brand: COATED VICRYL

## (undated) DEVICE — GLV SURG BIOGEL LTX PF 7 1/2

## (undated) DEVICE — STCKNT STRL 4X48 IN

## (undated) DEVICE — SUT SILK 0 SUTUPAK TIES 24IN SA76G

## (undated) DEVICE — PAD,PREPPING,CUFFED,24X48,7",NONSTERILE: Brand: MEDLINE

## (undated) DEVICE — NDL HYPO PRECISIONGLIDE REG 22G 1 1/2

## (undated) DEVICE — PROXIMATE RH ROTATING HEAD SKIN STAPLERS (35 WIDE) CONTAINS 35 STAINLESS STEEL STAPLES: Brand: PROXIMATE

## (undated) DEVICE — SUT MNCRYL 4/0 PS2 27IN UD MCP426H

## (undated) DEVICE — ELECTRD BLD EDGE/INSUL1P 2.4X5.1MM STRL

## (undated) DEVICE — INTENDED FOR TISSUE SEPARATION, AND OTHER PROCEDURES THAT REQUIRE A SHARP SURGICAL BLADE TO PUNCTURE OR CUT.: Brand: BARD-PARKER ® STAINLESS STEEL BLADES

## (undated) DEVICE — CVR PROB GEN PURP W ISOSILK 6X48

## (undated) DEVICE — 6617 IOBAN II PATIENT ISOLATION DRAPE 5/BX,4BX/CS: Brand: STERI-DRAPE™ IOBAN™ 2

## (undated) DEVICE — SUT NUROLON 3/0 RB1 CR8 18IN C553D

## (undated) DEVICE — DRSNG TELFA PAD NONADH STR 1S 3X8IN

## (undated) DEVICE — Device: Brand: IQ SYSTEM

## (undated) DEVICE — SYR CONTRL LUERLOK 10CC

## (undated) DEVICE — DRAPE,U/ SHT,SPLIT,PLAS,STERIL: Brand: MEDLINE

## (undated) DEVICE — SUT SILK 2/0 SUTUPAK TIES 24IN SA75H

## (undated) DEVICE — PROXIMATE RH ROTATING HEAD SKIN STAPLERS (35 REGULAR) CONTAINS 35 STAINLESS STEEL STAPLES: Brand: PROXIMATE

## (undated) DEVICE — CABL CONN ELECTRD GUIDELINE 4000LPPLS 9FT

## (undated) DEVICE — GOWN,NON-REINFORCED,SIRUS,SET IN SLV,XL: Brand: MEDLINE

## (undated) DEVICE — SPNG GZ WOVN 4X4IN 12PLY 10/BX STRL

## (undated) DEVICE — ANTENNA PROGRAMMER NEUROSTIM EXT 37092SFR

## (undated) DEVICE — CATH IV ANGIO FEP 12G 3IN LTBLU 10PK

## (undated) DEVICE — PAD GRND REM POLYHESIVE A/ DISP

## (undated) DEVICE — ARM SLING II: Brand: DEROYAL

## (undated) DEVICE — CLTH CLENS READYCLEANSE PERI CARE PK/5

## (undated) DEVICE — GLV SURG BIOGEL LTX PF 8

## (undated) DEVICE — CODMAN® SURGICAL PATTIES 1/2" X 1/2" (1.27CM X 1.27CM): Brand: CODMAN®

## (undated) DEVICE — DECANTER: Brand: UNBRANDED

## (undated) DEVICE — PACK,UNIVERSAL,NO GOWNS: Brand: MEDLINE